# Patient Record
Sex: FEMALE | Race: WHITE | NOT HISPANIC OR LATINO | URBAN - METROPOLITAN AREA
[De-identification: names, ages, dates, MRNs, and addresses within clinical notes are randomized per-mention and may not be internally consistent; named-entity substitution may affect disease eponyms.]

---

## 2023-12-19 ENCOUNTER — OFFICE VISIT (OUTPATIENT)
Dept: OBSTETRICS AND GYNECOLOGY | Facility: CLINIC | Age: 43
End: 2023-12-19
Payer: COMMERCIAL

## 2023-12-19 ENCOUNTER — PATIENT MESSAGE (OUTPATIENT)
Dept: SURGICAL ONCOLOGY | Facility: HOSPITAL | Age: 43
End: 2023-12-19

## 2023-12-19 VITALS
WEIGHT: 144 LBS | HEIGHT: 61 IN | BODY MASS INDEX: 27.19 KG/M2 | DIASTOLIC BLOOD PRESSURE: 60 MMHG | SYSTOLIC BLOOD PRESSURE: 120 MMHG

## 2023-12-19 DIAGNOSIS — Z12.4 CERVICAL CANCER SCREENING: ICD-10-CM

## 2023-12-19 DIAGNOSIS — Z01.419 WELL WOMAN EXAM WITH ROUTINE GYNECOLOGICAL EXAM: Primary | ICD-10-CM

## 2023-12-19 PROCEDURE — 88141 CYTOPATH C/V INTERPRET: CPT | Performed by: PATHOLOGY

## 2023-12-19 PROCEDURE — 88175 CYTOPATH C/V AUTO FLUID REDO: CPT

## 2023-12-19 PROCEDURE — 87624 HPV HI-RISK TYP POOLED RSLT: CPT

## 2023-12-19 PROCEDURE — 99386 PREV VISIT NEW AGE 40-64: CPT | Performed by: OBSTETRICS & GYNECOLOGY

## 2023-12-19 PROCEDURE — 1036F TOBACCO NON-USER: CPT | Performed by: OBSTETRICS & GYNECOLOGY

## 2023-12-19 RX ORDER — ESTRADIOL 0.1 MG/G
CREAM VAGINAL
COMMUNITY
Start: 2023-09-25

## 2023-12-19 RX ORDER — MONTELUKAST SODIUM 4 MG/1
TABLET, CHEWABLE ORAL
COMMUNITY

## 2023-12-19 RX ORDER — TAMOXIFEN CITRATE 10 MG/1
10 TABLET ORAL DAILY
COMMUNITY
End: 2024-05-16

## 2023-12-19 RX ORDER — FLUTICASONE PROPIONATE 50 MCG
1 SPRAY, SUSPENSION (ML) NASAL
COMMUNITY
Start: 2012-10-16

## 2023-12-19 ASSESSMENT — PAIN SCALES - GENERAL: PAINLEVEL: 0-NO PAIN

## 2023-12-19 NOTE — PROGRESS NOTES
LEORA ( Breast/Pelvic Exam )    Last pap: not sure ( nothing in system )  Last mammo: [facility] breast [facility]  Last colon: n/a    CHAPERONE, BRANDON FOSS, NRCMA III    43-year-old  1 (1001) who comes in for her annual gynecologic exam  Her Care Was at [facility].  She denies any prior history of abnormal Paps however does not recall when her last one was.    She continues to have menses q. monthly, her partner has had a vasectomy    She is followed at the high risk breast clinic @ [facility]  She had a right breast biopsy consistent with LCIS and believed to have a 80% lifetime risk of breast cancer  Mother had breast cancer in her 60s  She also has very dense breasts imaging is difficult  She has had multiple MRIs  She declines a breast exam here today    She was started on tamoxifen 20 mg approximately 1 year ago  She could not tolerate this dose secondary to side effects  She was restarted in 2023 on a 10 mg dose which she is currently on  She has an MRI scheduled on 2023-  she is considering prophylactic mastectomy    CONSTITUTIONAL: Alert and in no acute distress. Well developed, well nourished.   HEAD AND FACE: Head and face: Normal.   EYES: Normal external exam - nonicteric sclera, extraocular movements intact (EOMI) and no ptosis.   EARS, NOSE, MOUTH, AND THROAT: External inspection of ears and nose: Normal.     GENITOURINARY: External genitalia: Normal. No inguinal lymphadenopathy. Bartholin's Urethral and Skenes Glands: Normal. Urethra: Normal. Bladder: Normal on palpation. Vagina: Normal. Cervix: Normal. Uterus: Normal. Right Adnexa/parametria: Normal. Left Adnexa/parametria: Normal. Inspection of Perianal Area: Normal.   MUSCULOSKELETAL: No joint swelling seen, normal movements of all extremities.   SKIN: Normal skin color and pigmentation, normal skin turgor, and no rash.   NEUROLOGIC: Non-focal. Grossly intact.        PSYCHIATRIC: Alert and oriented x 3. Affect normal to patient baseline. Mood:  Appropriate.     Assessment/plan  High risk breast cancer on tamoxifen, prior history of LCIS   Pap with cotesting done

## 2023-12-22 ENCOUNTER — ANCILLARY PROCEDURE (OUTPATIENT)
Dept: RADIOLOGY | Facility: CLINIC | Age: 43
End: 2023-12-22
Payer: COMMERCIAL

## 2023-12-22 DIAGNOSIS — Z12.39 ENCOUNTER FOR OTHER SCREENING FOR MALIGNANT NEOPLASM OF BREAST: ICD-10-CM

## 2023-12-22 DIAGNOSIS — R92.2 INCONCLUSIVE MAMMOGRAM: Primary | ICD-10-CM

## 2023-12-22 PROCEDURE — 77049 MRI BREAST C-+ W/CAD BI: CPT

## 2023-12-22 PROCEDURE — 2550000001 HC RX 255 CONTRASTS: Performed by: NURSE PRACTITIONER

## 2023-12-22 PROCEDURE — 77049 MRI BREAST C-+ W/CAD BI: CPT | Mod: BILATERAL PROCEDURE | Performed by: STUDENT IN AN ORGANIZED HEALTH CARE EDUCATION/TRAINING PROGRAM

## 2023-12-22 PROCEDURE — A9575 INJ GADOTERATE MEGLUMI 0.1ML: HCPCS | Performed by: NURSE PRACTITIONER

## 2023-12-22 RX ORDER — GADOTERATE MEGLUMINE 376.9 MG/ML
12 INJECTION INTRAVENOUS
Status: COMPLETED | OUTPATIENT
Start: 2023-12-22 | End: 2023-12-22

## 2023-12-22 RX ADMIN — GADOTERATE MEGLUMINE 12 ML: 376.9 INJECTION INTRAVENOUS at 14:58

## 2023-12-27 DIAGNOSIS — R92.8 ABNORMAL FINDING ON BREAST IMAGING: ICD-10-CM

## 2023-12-27 NOTE — PROGRESS NOTES
"aSrah Welch female   1980 43 y.o.   [id number]      Chief Complaint  High risk surveillance care, left breast mass    History Of Present Illness  Sarah Welch is a very pleasant 43 year old  woman [facility] for [facility] surveillance care. 2021 she had a right breast excisional biopsy demonstrating lobular carcinoma in situ (LCIS) and complex sclerosing lesion. 2022 she underwent additional right breast MRI core biopsy due to non-mass enhancement. Final pathology demonstrated LCIS, atypical lobular hyperplasia (ALH) and other benign findings. She did not have an additional excision and opted for observation with imaging. She has a remote history of left breast excisional biopsy in , benign. She started Tamoxifen in 2021 and took for 4 months. She self-discontinued due to intolerable hot flashes and vaginal dryness. She restarted Tamoxifen 10mg daily for three years in 2023. She is currently tolerating with severe vaginal dryness and \"cuts\". She was started on Estradiol cream. She is currently being followed for a stable left breast mass noted on ultrasound. She has family history of breast cancer in her mother, age 63 and paternal half cousin, gene positive. She presents today for second look ultrasound from recent MRI. She denies any new masses or lumps. She has a history of basal cell carcinoma, surgically excised from arm, chest, leg and face. She follows with a Dermatologist every 6 months. She is also interested in discussing bilateral prophylactic mastectomy with reconstruction.      BREAST IMAGIN2023 Bilateral Full MRI, indicates BI-RADS Category 3. Left breast, probably benign rim enhancing mass warranting second look ultrasound. If no sonographic correlate, short term MRI follow up recommended. 2023 Bilateral diagnostic mammogram and left breast ultrasound, indicates BI-RADS Category 3. Left breast, stable probably benign mass " warranting final follow up in one year.      FEMALE HISTORY: menarche age 9, , first birth age 33,  x 6 months, OCP's x 16 years, premenopausal, LMP 2023, no contraception,  had vasectomy, current use of Estradiol cream, extremely dense tissue     FAMILY CANCER HISTORY:  Mother: Breast cancer, age 63   Paternal Half Cousin: Breast cancer, 30s, contralateral recurrence, age 51, reports positive for a gene, unsure of type      Surgical History  She has a past surgical history that includes Breast lumpectomy (10/04/2016).     Social History  She reports that she has never smoked. She has never used smokeless tobacco. She reports current alcohol use. She reports that she does not use drugs.    Family History  No family history on file.     Allergies  Promethazine, Penicillins, and Tea tree oil    Medications  Current Outpatient Medications   Medication Instructions    estradiol (Estrace) 0.01 % (0.1 mg/gram) vaginal cream INSERT 1/4 APPLICATORFUL VAGINALLY THREE TIMES WEEKLY.    fluticasone (Flonase) 50 mcg/actuation nasal spray 1 spray, nasal, Daily RT    montelukast (Singulair) 4 mg chewable tablet oral    tamoxifen (NOLVADEX) 10 mg, oral, Daily         REVIEW OF SYSTEMS    Constitutional:  Negative for appetite change, fatigue, fever and unexpected weight change.   HENT:  Negative for ear pain, hearing loss, nosebleeds, sore throat and trouble swallowing.    Eyes:  Negative for discharge, itching and visual disturbance.   Respiratory:  Negative for cough, chest tightness and shortness of breath.    Cardiovascular:  Negative for chest pain, palpitations and leg swelling.   Breast: as indicated in HPI  Gastrointestinal:  Negative for abdominal pain, constipation, diarrhea and nausea.   Endocrine: Negative for cold intolerance and heat intolerance.   Genitourinary:  Negative for dysuria, frequency, hematuria, pelvic pain and vaginal bleeding.   Musculoskeletal:  Negative for arthralgias, back  pain, gait problem, joint swelling and myalgias.   Skin:  Negative for color change and rash.   Allergic/Immunologic: Negative for environmental allergies and food allergies.   Neurological:  Negative for dizziness, tremors, speech difficulty, weakness, numbness and headaches.   Hematological:  Does not bruise/bleed easily.   Psychiatric/Behavioral:  Negative for agitation, dysphoric mood and sleep disturbance. The patient is not nervous/anxious.         Past Medical History  She has a past medical history of Personal history of other malignant neoplasm of skin.     Physical Exam  Patient is alert and oriented x3 and in a relaxed and appropriate mood. Her gait is steady and hand grasps are equal. Sclera is clear. The breasts are nearly symmetrical. The tissue is very dense without palpable abnormalities, discrete nodules or masses. Both breasts have well-healed partial circumareolar excisional biopsy incisions. The skin and nipples appear normal. There is a well-healed incision mid chest from BCC removal. There is no cervical, supraclavicular or axillary lymphadenopathy. Heart rate and rhythm normal, S1 and S2 appreciated. The lungs are clear to auscultation bilaterally. Abdomen is soft and non-tender.     Physical Exam  Chest:              Last Recorded Vitals  Vitals:    01/03/24 1328   BP: 122/80   Pulse: 91   Resp: 16       Relevant Results   Time was spent discussing digital images of the radiology testing with the patient. I explained the results in depth, along with suggested explanation for follow up recommendations based on the testing results. BI-RADS Category 3    Imaging    Study Result    Narrative & Impression   Interpreted By:  Kris Hatfield,   STUDY:  BI US BREAST LIMITED LEFT;  1/3/2024 2:33 pm      ACCESSION NUMBER(S):  [id number]      ORDERING CLINICIAN:  HANG FRANKS      INDICATION:  Second-look ultrasound for evaluation of a probably benign rim  enhancing left breast mass seen on recent MRI  dated 12/22/2023.      COMPARISON:  MRI from 12/22/2023, mammogram and ultrasound from 08/16/2023,  12/02/2022      FINDINGS:  Targeted ultrasound was performed of the left breast by a registered  sonographer with elastography. Multiple benign circumscribed  hypoechoic and anechoic cysts with increased through transmission are  seen throughout the left breast, the largest in the left breast at  2:30, 2 cm from the nipple. These are soft on elastography and do not  contain flow. It is difficult to distinguish if these cysts might  correspond to the finding seen on MRI. No suspicious masses are  visualized in therefore short-term follow-up on MRI can be performed.      IMPRESSION:  Probably benign rim enhancing left breast mass seen on MRI without a  suspicious corresponding mass seen on ultrasound. Short-term  follow-up MRI is recommended in 6 months to ensure stability.      BI-RADS CATEGORY:      BI-RADS Category:  3 Probably Benign.  Recommendation:  Short Interval Follow-up.  Recommended Date:  6 Months.  Laterality:  Left.           MR breast bilateral w contrast full protocol 12/22/2023    Narrative  Interpreted By:  Megan Thompson,  STUDY:  BI MR BREAST BILATERAL WITH CONTRAST FULL PROTOCOL;  12/22/2023 2:57  pm    ACCESSION NUMBER(S):  [id number]    ORDERING CLINICIAN:  HANG FRANKS    INDICATION:  High-risk screening. Family history of breast cancer. Personal  history right breast core biopsy yielding LCIS and ALH (01/2022).  History of benign right excisional biopsy for LCIS and sclerosing  lesion.    COMPARISON:  Breast MRI 07/01/2022 mammogram 08/16/2023, ultrasound 08/16/2023,  12/02/2022    TECHNIQUE:  Using a dedicated breast coil, STIR axial and T1-weighted fat  saturation axial images of the breasts were obtained, the latter both  before and after intravenous administration of Gadolinium DTPA. On an  independent workstation, 3-D images were formulated using Expert360  including time enhancement  curves, subtraction images and MIP images.    Intravenous contrast:  12 mL of Dotarem    FINDINGS:  There is  symmetric  moderate bilateral background enhancement.    Density:  The breast tissue is extremely dense, which may limit the  sensitivity of mammography.    RIGHT BREAST: There is a biopsy marker clip in the upper-outer  quadrant of the right breast at posterior depth. No suspicious mass  or nonmass enhancement is identified.    No axillary or internal mammary lymphadenopathy is appreciated.    LEFT BREAST: There is a 0.6 x 0.6 x 0.6 round rim enhancing mass with  associated T2 hyperintense signal in the upper slightly outer left  breast at middle depth (series 7, image 58). Prominent patchy  peripheral enhancement in central inner left breast at middle and  posterior depth is similar to prior exam is favored to represent  background parenchymal enhancement (series 8, image 79). There are a  few scattered benign cysts measuring up to 2.5 cm.    No axillary or internal mammary lymphadenopathy is appreciated.    NON-BREAST FINDINGS:  None.    Impression  1. Probably benign rim enhancing mass likely an inflamed cyst in the  left breast. Targeted ultrasound is recommended for further  evaluation. If the finding is not identified on ultrasound, follow-up  with MRI is recommended in six-months.  2. No MRI evidence of malignancy in the right breast.    Of note, patient has a probably benign complicated cyst in the left  breast at 10 o'clock for which final follow-up in 1 year was  recommended on 08/16/2023.    BI-RADS CATEGORY:  BI-RADS Category:  3 Probably Benign.  Recommendation:  Recommendations as Above.  Recommended Date:  1 Month.  Laterality:  Left.    For any future breast imaging appointments, please call [phone]  ([phone]).      MACRO:  None    Signed by: Megan Thompson 12/26/2023 8:46 AM  Dictation workstation:   [id number]       Assessment/Plan   High risk surveillance care, normal clinical exam  and stable imaging, left breast masses, history of right breast LCIS, family history of breast cancer, Tamoxifen therapy, extremely dense tissue     Plan: Return in August 2024 for bilateral diagnostic mammogram with left breast ultrasound and office visit. Continue Tamoxifen 10mg daily. Continue Estradiol as prescribed. Will refer to Breast Surgery for discussion of prophylactic mastectomy following visit in August pending normal imaging results.     Patient Discussion/Summary  Your clinical examination and imaging are stable. Please return in August 2024 for bilateral diagnostic mammogram with left breast ultrasound and office visit or sooner if you have any problems or concerns. Proceed to Full MRI in July 2024. Continue Tamoxifen 10mg daily.     You can see your health information, review clinical summaries from office visits & test results online when you follow your health with MY  Chart, a personal health record. To sign up go to [email or web address]. If you need assistance with signing up or trouble getting into your account call [employer] Patient Line 24/7 at [phone].    My office phone number is [phone] if you need to get in touch with me or have additional questions or concerns. Thank you for choosing [facility] and trusting me as your healthcare provider. I look forward to seeing you again at your next office visit. I am honored to be a provider on your health care team and I remain dedicated to helping you achieve your health goals.      Prabha Verduzco, APRN-CNP

## 2024-01-03 ENCOUNTER — OFFICE VISIT (OUTPATIENT)
Dept: SURGICAL ONCOLOGY | Facility: HOSPITAL | Age: 44
End: 2024-01-03
Payer: COMMERCIAL

## 2024-01-03 ENCOUNTER — HOSPITAL ENCOUNTER (OUTPATIENT)
Dept: RADIOLOGY | Facility: HOSPITAL | Age: 44
Discharge: HOME | End: 2024-01-03
Payer: COMMERCIAL

## 2024-01-03 VITALS
WEIGHT: 144 LBS | HEART RATE: 91 BPM | DIASTOLIC BLOOD PRESSURE: 80 MMHG | HEIGHT: 61 IN | SYSTOLIC BLOOD PRESSURE: 122 MMHG | RESPIRATION RATE: 16 BRPM | BODY MASS INDEX: 27.19 KG/M2

## 2024-01-03 DIAGNOSIS — Z12.39 BREAST CANCER SCREENING, HIGH RISK PATIENT: ICD-10-CM

## 2024-01-03 DIAGNOSIS — R92.8 ABNORMAL FINDING ON BREAST IMAGING: ICD-10-CM

## 2024-01-03 DIAGNOSIS — R92.8 ABNORMAL MRI, BREAST: Primary | ICD-10-CM

## 2024-01-03 PROCEDURE — 76982 USE 1ST TARGET LESION: CPT | Mod: LT

## 2024-01-03 PROCEDURE — 1036F TOBACCO NON-USER: CPT | Performed by: NURSE PRACTITIONER

## 2024-01-03 PROCEDURE — 76642 ULTRASOUND BREAST LIMITED: CPT | Mod: LT

## 2024-01-03 PROCEDURE — 99213 OFFICE O/P EST LOW 20 MIN: CPT | Performed by: NURSE PRACTITIONER

## 2024-01-03 NOTE — PATIENT INSTRUCTIONS
Your clinical examination and imaging are stable. Please return in August 2024 for bilateral diagnostic mammogram with left breast ultrasound and office visit or sooner if you have any problems or concerns. Proceed to Full MRI in July 2024. Continue Tamoxifen 10mg daily.     You can see your health information, review clinical summaries from office visits & test results online when you follow your health with MY  Chart, a personal health record. To sign up go to [email or web address]. If you need assistance with signing up or trouble getting into your account call [employer] Patient Line 24/7 at [phone].    My office phone number is [phone] if you need to get in touch with me or have additional questions or concerns. Thank you for choosing [facility] and trusting me as your healthcare provider. I look forward to seeing you again at your next office visit. I am honored to be a provider on your health care team and I remain dedicated to helping you achieve your health goals.

## 2024-06-17 ENCOUNTER — OFFICE VISIT (OUTPATIENT)
Dept: SURGICAL ONCOLOGY | Facility: HOSPITAL | Age: 44
End: 2024-06-17
Payer: COMMERCIAL

## 2024-06-17 VITALS — WEIGHT: 140 LBS | HEIGHT: 61 IN | BODY MASS INDEX: 26.43 KG/M2

## 2024-06-17 DIAGNOSIS — D05.01 LOBULAR CARCINOMA IN SITU (LCIS) OF RIGHT BREAST: ICD-10-CM

## 2024-06-17 DIAGNOSIS — Z91.89 AT HIGH RISK FOR BREAST CANCER: ICD-10-CM

## 2024-06-17 DIAGNOSIS — R92.2 DENSE BREASTS: ICD-10-CM

## 2024-06-17 DIAGNOSIS — R92.30 DENSE BREASTS: ICD-10-CM

## 2024-06-17 DIAGNOSIS — R92.8 ABNORMAL MRI, BREAST: ICD-10-CM

## 2024-06-17 PROCEDURE — 99204 OFFICE O/P NEW MOD 45 MIN: CPT | Performed by: SURGERY

## 2024-06-17 PROCEDURE — 99214 OFFICE O/P EST MOD 30 MIN: CPT | Performed by: SURGERY

## 2024-06-17 NOTE — PROGRESS NOTES
BREAST SURGERY HIGH RISK SURGICAL CONSULTATION    Assessment/Plan     1. High risk with lifetime risk 80%   - following enhanced screening    2. Peronal history LCIS    3. Family history of breast cancer    4. Probably benign left breast cyst seen on MRI 12/22/2023 without sonographic correlate   -6 month MRI recommened.    Due for MRI now for probably benign findings.    Risk reducing mastectomy is reasonable considering elevated risk and side effects related to tamoxifen.  Will need to meet with Dr. Jimenez to discuss reconstruction options.  Would be nipple sparing candidate with DTI.  Has history of keloid scarrning- may benefit from kenalog injection intra operatively.  Plan for surgery in November pending insurance authorization.     Subjective   Sarah Welch is a 44 y.o. female presents today for surgical discussion prophylactic mastectomy.    Followed in high risk clinic for lifetime risk 80% related to LCIS, extremely dense breasts and family history.     Last breast imaging was MRI in December with probably benign left breast findings without a sonographic correlate.     She has been on tamoxifen and having significant side effects.  She is on a reduced dose of tamoxifen as a result.      Treatment history      Review of Systems   Constitutional symptoms: Denies generalized fatigue.  Denies weight change, fevers/chills, difficulty sleeping   Eyes: Denies double vision, glaucoma, cataracts.  Ear/nose/throat/mouth: Denies hearing changes, sore throat, sinus problems.  Cardiovascular: No chest pain.  Denies irregular heartbeat.  Denies ankle swelling.  Respiratory: No wheezing, cough, or shortness of breath.  Gastrointestinal: No abdominal pain,  No nausea/vomiting.  No indigestion/heartburn.  No change in bowel habits.  No constipation or diarrhea.   Genitourinary: No urinary incontinence.  No urinary frequency.  No painful urination.  Musculoskeletal: No bone pain, no muscle pain, no joint pain.    Integumentary: No rash. No masses.  No changes in moles.  No easy bruising.  Neurological: No headaches.  No tremors. No numbness/tingling.  Psychiatric: No anxiety. No depression.  Endocrine: No excessive thirst.  Not too hot or too cold.  Not tired or fatigued.    Hematological/lymphatic: No swollen glands or blood clotting problems.  No bruising.    Objective   Physical Exam  General: Alert and oriented x 3.  Mood and affect are appropriate.  HEENT: EOMI, PERRLA.   Neck: supple, no masses, no cervical adenopathy.  Cardiovascular: no lower extremity edema.  Pulmonary: breathing non labored on room air.  GI: Abdomen soft, no masses. No hepatomegaly or splenomegaly.  Lymph nodes: No supraclavicular or axillary adenopathy bilaterally.  Musculoskeletal: Full range of motion in the upper extremities bilaterally.  Neuro: denies dizziness, tremors    Breasts: The breasts were examined in both the seated and supine positions.    RIGHT: The nipple is everted without nipple discharge.  There are no skin changes, skin thickening, or dimpling. There are no masses palpated in the RIGHT breast.   LEFT: The nipple is everted without nipple discharge.  There are no skin changes, skin thickening, or dimpling. There are no masses palpated in the LEFT breast.   Keloid scar on the mid chest overlying sternum and extending slightly to medial right breast.  Bilateral breast excisional biopsy scars without significant keloiding.    Radiology review: All images and reports were personally reviewed.         Beth Prajapati, DO

## 2024-06-18 ENCOUNTER — PATIENT MESSAGE (OUTPATIENT)
Dept: SURGICAL ONCOLOGY | Facility: HOSPITAL | Age: 44
End: 2024-06-18
Payer: COMMERCIAL

## 2024-06-18 NOTE — TELEPHONE ENCOUNTER
From: Sarah Welch  To: Beth Prajapati  Sent: 6/18/2024 9:47 AM EDT  Subject: Tamoxifen    Hi Dr Prajapati,  Thank you so much for the visit yesterday--I am a little nervous, but also relieved we are moving forward with the mastectomy's.     I knew I would have a question~ What are your thoughts on Tamoxifen? Should I continue or can I discontinue taking this medicine?    Sarah

## 2024-07-02 ENCOUNTER — HOSPITAL ENCOUNTER (OUTPATIENT)
Dept: RADIOLOGY | Facility: HOSPITAL | Age: 44
Discharge: HOME | End: 2024-07-02
Payer: COMMERCIAL

## 2024-07-02 DIAGNOSIS — R92.2 DENSE BREASTS: ICD-10-CM

## 2024-07-02 DIAGNOSIS — R92.30 DENSE BREASTS: ICD-10-CM

## 2024-07-02 DIAGNOSIS — R92.8 ABNORMAL MRI, BREAST: ICD-10-CM

## 2024-07-02 PROCEDURE — 77049 MRI BREAST C-+ W/CAD BI: CPT | Performed by: STUDENT IN AN ORGANIZED HEALTH CARE EDUCATION/TRAINING PROGRAM

## 2024-07-02 PROCEDURE — A9575 INJ GADOTERATE MEGLUMI 0.1ML: HCPCS | Performed by: SURGERY

## 2024-07-02 PROCEDURE — 2500000004 HC RX 250 GENERAL PHARMACY W/ HCPCS (ALT 636 FOR OP/ED): Performed by: SURGERY

## 2024-07-02 PROCEDURE — 77049 MRI BREAST C-+ W/CAD BI: CPT

## 2024-07-02 RX ORDER — GADOTERATE MEGLUMINE 376.9 MG/ML
13 INJECTION INTRAVENOUS
Status: COMPLETED | OUTPATIENT
Start: 2024-07-02 | End: 2024-07-02

## 2024-08-09 ENCOUNTER — PATIENT MESSAGE (OUTPATIENT)
Dept: SURGICAL ONCOLOGY | Facility: HOSPITAL | Age: 44
End: 2024-08-09
Payer: COMMERCIAL

## 2024-08-13 NOTE — PROGRESS NOTES
"  Sarah Welch female   1980 44 y.o.   [id number]      Chief Complaint  High risk surveillance care, left breast mass    History Of Present Illness  Sarah Welch is a very pleasant 44 year old  woman [facility]. 2021 she had a right breast excisional biopsy demonstrating lobular carcinoma in situ (LCIS) and complex sclerosing lesion. 2022 she underwent additional right breast MRI core biopsy due to non-mass enhancement. Final pathology demonstrated LCIS, atypical lobular hyperplasia (ALH) and other benign findings. She did not have an additional excision and opted for observation with imaging. She has a remote history of left breast excisional biopsy in , benign. She started Tamoxifen in 2021 and took for 4 months. She self-discontinued due to intolerable hot flashes and vaginal dryness. She restarted Tamoxifen 10mg daily for three years in 2023. She was having severe vaginal dryness and \"cuts\". She was started on Estradiol cream. She states the cream helped a little, but was still intolerable and discontinued in 2024. She is currently being followed for a stable left breast mass noted on ultrasound. She has family history of breast cancer in her mother, age 63 and paternal half cousin, gene positive. She has a history of basal cell carcinoma, surgically excised from arm, chest, leg and face. She follows with a Dermatologist every 6 months. She is tentatively scheduled for bilateral prophylactic mastectomy with reconstruction on 11/15/2024 (Alo/Barbara). She presents today for annual mammogram and follow up left breast mass. She denies any new masses or lumps.     BREAST IMAGIN2024 Bilateral Full MRI, indicates BI-RADS Category 2. 2023 Bilateral diagnostic mammogram and left breast ultrasound, indicates BI-RADS Category 3. Left breast, stable probably benign mass warranting final follow up in one year. "      FEMALE HISTORY: menarche age 9, , first birth age 33,  x 6 months, OCP's x 16 years, premenopausal, no contraception,  had vasectomy, extremely dense tissue     FAMILY CANCER HISTORY:  Mother: Breast cancer, age 63   Paternal Half Cousin: Breast cancer, 30s, contralateral recurrence, age 51, reports positive for a gene, unsure of type      Surgical History  She has a past surgical history that includes Breast biopsy.     Social History  She reports that she has never smoked. She has never used smokeless tobacco. She reports current alcohol use. She reports that she does not use drugs.    Family History  No family history on file.     Allergies  Promethazine, Penicillins, and Tea tree oil    Medications  Current Outpatient Medications   Medication Instructions    montelukast (Singulair) 4 mg chewable tablet oral    tamoxifen (NOLVADEX) 10 mg, oral, Daily         REVIEW OF SYSTEMS    Constitutional:  Negative for appetite change, fatigue, fever and unexpected weight change.   HENT:  Negative for ear pain, hearing loss, nosebleeds, sore throat and trouble swallowing.    Eyes:  Negative for discharge, itching and visual disturbance.   Respiratory:  Negative for cough, chest tightness and shortness of breath.    Cardiovascular:  Negative for chest pain, palpitations and leg swelling.   Breast: as indicated in HPI  Gastrointestinal:  Negative for abdominal pain, constipation, diarrhea and nausea.   Endocrine: Negative for cold intolerance and heat intolerance.   Genitourinary:  Negative for dysuria, frequency, hematuria, pelvic pain and vaginal bleeding.   Musculoskeletal:  Negative for arthralgias, back pain, gait problem, joint swelling and myalgias.   Skin:  Negative for color change and rash.   Allergic/Immunologic: Negative for environmental allergies and food allergies.   Neurological:  Negative for dizziness, tremors, speech difficulty, weakness, numbness and headaches.   Hematological:   Does not bruise/bleed easily.   Psychiatric/Behavioral:  Negative for agitation, dysphoric mood and sleep disturbance. The patient is not nervous/anxious.         Past Medical History  She has a past medical history of Personal history of other malignant neoplasm of skin.     Physical Exam  Patient is alert and oriented x3 and in a relaxed and appropriate mood. Her gait is steady and hand grasps are equal. Sclera is clear. The breasts are nearly symmetrical. The tissue is very dense without palpable abnormalities, discrete nodules or masses. Both breasts have well-healed partial circumareolar excisional biopsy incisions. The skin and nipples appear normal. There is a well-healed incision mid chest from BCC removal with keloid. There is no cervical, supraclavicular or axillary lymphadenopathy. Heart rate and rhythm normal, S1 and S2 appreciated. The lungs are clear to auscultation bilaterally. Abdomen is soft and non-tender.     Physical Exam  Chest:              Last Recorded Vitals  Vitals:    08/21/24 0759   BP: 102/62   Pulse: 63   Resp: 16         Relevant Results   Time was spent discussing digital images of the radiology testing with the patient. I explained the results in depth, along with suggested explanation for follow up recommendations based on the testing results. BI-RADS Category 2    Imaging  Narrative & Impression   Interpreted By:  Gabriel Rojas,   STUDY:  BI MAMMO BILATERAL DIAGNOSTIC TOMOSYNTHESIS; BI US BREAST LIMITED  LEFT;  8/21/2024 9:20 am; 8/21/2024 9:49 am      ACCESSION NUMBER(S):  [id number]; [id number]      ORDERING CLINICIAN:  HANG FRANKS      INDICATION:  Final follow-up for a probably benign finding in the left breast.      COMPARISON:  05/31/2022      FINDINGS:  MAMMOGRAPHY: 2D and tomosynthesis images were reviewed at 1 mm slice  thickness.      Density:  The breast tissue is extremely dense, which may limit the  sensitivity of mammography.      Multiple bilateral  circumscribed masses are noted again. No new  suspicious masses or calcifications are identified.      ULTRASOUND: Targeted ultrasound was performed of the left breast by a  registered sonographer with elastography. Sonographic images of the  left breast the 10 o'clock position 3 cm from nipple redemonstrates  the it measures 0.8 x 0.4 x 0.5 cm. It is soft on avascular still.  This demonstrates long-term stability and is considered benign.      IMPRESSION:  Previously described finding at the 10 o'clock position of the left  breast is now considered benign. No new mammographic evidence of  malignancy.      BI-RADS CATEGORY:  BI-RADS Category:  2 Benign.  Recommendation:  Annual Screening.  Recommended Date:  1 Year.  Laterality:  Bilateral.       Assessment/Plan   High risk surveillance care, normal clinical exam and imaging, left breast masses, stable and benign, history of right breast LCIS, family history of breast cancer, Tamoxifen therapy, extremely dense tissue     Plan: Return in one year for clinical exam and office visit. If surgery has not been performed we will proceed with annual mammogram.     Patient Discussion/Summary  Your clinical examination and imaging are normal. Please return in one year for clinical exam and office visit. If surgery has not been performed we will proceed with annual mammogram.     You can see your health information, review clinical summaries from office visits & test results online when you follow your health with MY  Chart, a personal health record. To sign up go to [email or web address]. If you need assistance with signing up or trouble getting into your account call hint Patient Line 24/7 at [phone].    My office phone number is [phone] if you need to get in touch with me or have additional questions or concerns. Thank you for choosing [facility] and trusting me as your healthcare provider. I look forward to seeing you again at your next  office visit. I am honored to be a provider on your health care team and I remain dedicated to helping you achieve your health goals.      Prabha Verduzco, APRN-CNP

## 2024-08-21 ENCOUNTER — HOSPITAL ENCOUNTER (OUTPATIENT)
Dept: RADIOLOGY | Facility: HOSPITAL | Age: 44
Discharge: HOME | End: 2024-08-21
Payer: COMMERCIAL

## 2024-08-21 ENCOUNTER — OFFICE VISIT (OUTPATIENT)
Dept: SURGICAL ONCOLOGY | Facility: HOSPITAL | Age: 44
End: 2024-08-21
Payer: COMMERCIAL

## 2024-08-21 VITALS
RESPIRATION RATE: 16 BRPM | WEIGHT: 130 LBS | HEART RATE: 63 BPM | SYSTOLIC BLOOD PRESSURE: 102 MMHG | HEIGHT: 61 IN | BODY MASS INDEX: 24.55 KG/M2 | DIASTOLIC BLOOD PRESSURE: 62 MMHG

## 2024-08-21 DIAGNOSIS — R92.8 OTHER ABNORMAL AND INCONCLUSIVE FINDINGS ON DIAGNOSTIC IMAGING OF BREAST: ICD-10-CM

## 2024-08-21 DIAGNOSIS — Z12.39 BREAST CANCER SCREENING, HIGH RISK PATIENT: Primary | ICD-10-CM

## 2024-08-21 DIAGNOSIS — R92.30 DENSE BREAST TISSUE: ICD-10-CM

## 2024-08-21 PROCEDURE — 3008F BODY MASS INDEX DOCD: CPT | Performed by: NURSE PRACTITIONER

## 2024-08-21 PROCEDURE — 76982 USE 1ST TARGET LESION: CPT | Mod: LT

## 2024-08-21 PROCEDURE — 77062 BREAST TOMOSYNTHESIS BI: CPT

## 2024-08-21 PROCEDURE — 99213 OFFICE O/P EST LOW 20 MIN: CPT | Performed by: NURSE PRACTITIONER

## 2024-08-21 PROCEDURE — 1036F TOBACCO NON-USER: CPT | Performed by: NURSE PRACTITIONER

## 2024-08-21 PROCEDURE — 76642 ULTRASOUND BREAST LIMITED: CPT | Mod: LT

## 2024-08-21 NOTE — PATIENT INSTRUCTIONS
Your clinical examination and imaging are normal. Please return in one year for clinical exam and office visit. If surgery has not been performed we will proceed with annual mammogram.     You can see your health information, review clinical summaries from office visits & test results online when you follow your health with MY  Chart, a personal health record. To sign up go to [email or web address]. If you need assistance with signing up or trouble getting into your account call [employer] Patient Line 24/7 at [phone].    My office phone number is [phone] if you need to get in touch with me or have additional questions or concerns. Thank you for choosing [facility] and trusting me as your healthcare provider. I look forward to seeing you again at your next office visit. I am honored to be a provider on your health care team and I remain dedicated to helping you achieve your health goals.

## 2024-09-23 ENCOUNTER — APPOINTMENT (OUTPATIENT)
Dept: PLASTIC SURGERY | Facility: CLINIC | Age: 44
End: 2024-09-23
Payer: COMMERCIAL

## 2024-09-23 VITALS — BODY MASS INDEX: 24.55 KG/M2 | HEIGHT: 61 IN | WEIGHT: 130 LBS

## 2024-09-23 DIAGNOSIS — Z91.89 INCREASED RISK OF BREAST CANCER: Primary | ICD-10-CM

## 2024-09-23 PROCEDURE — 99215 OFFICE O/P EST HI 40 MIN: CPT | Performed by: SURGERY

## 2024-09-23 NOTE — LETTER
September 23, 2024     Beth Prajapati DO  [id number] [facility], [address] [place] [id number]    Patient: Sarah Welch   YOB: 1980   Date of Visit: 9/23/2024       Dear Dr. Beth Prajapati DO:    Thank you for referring Sarah Welch to me for evaluation. Below are my notes for this consultation.  If you have questions, please do not hesitate to call me. I look forward to following your patient along with you.       Sincerely,     Rex Jimenez MD      CC: No Recipients  ______________________________________________________________________________________                    Division of Plastic & Reconstructive Surgery            New patient visit    Date: 9/23/2024         Subjective  Sarah Welch is a 44 y.o. female who was referred by Dr. Beth Prajapati  for increased lifetime risk of breast cancer.    Asiya is a very pleasant 44-year-old female who is being followed by her breast colleagues for increased lifetime risk of breast cancer, 80% related to LCIS and extremely dense breast tissue and family history.  Most recent imaging was an MRI performed in December showing a probably benign left breast findings without sonographic correlate.  Patient has been on tamoxifen and is noted to have side effects and last visit with our breast colleagues.  Per chart review: 6/30/2021 she had a right breast excisional biopsy demonstrating lobular carcinoma in situ (LCIS) and complex sclerosing lesion. 1/19/2022 she underwent additional right breast MRI core biopsy due to non-mass enhancement. Final pathology demonstrated LCIS, atypical lobular hyperplasia (ALH) and other benign findings. She did not have an additional excision and opted for observation with imaging. She has a remote history of left breast excisional biopsy in 2010, benign. She started Tamoxifen in November 2021 and took for 4 months. She self-discontinued due to intolerable hot flashes and vaginal dryness. She  "restarted Tamoxifen 10mg daily for three years in July 2023. She was having severe vaginal dryness and \"cuts\". She was started on Estradiol cream. She states the cream helped a little, but was still intolerable and discontinued in July 2024. She is currently being followed for a stable left breast mass noted on ultrasound. She has family history of breast cancer in her mother, age 63 and paternal half cousin, gene positive. She has a history of basal cell carcinoma, surgically excised from arm, chest, leg and face     All other systems have been reviewed with the patient and have been found to be negative with exception to the chief complaint as mentioned in the history of present illness.    ROS: As noted in history of present illness  - CONSTITUTIONAL: Denies weight loss, fever and chills.  - HEENT: Denies changes in vision and hearing.  - RESPIRATORY: Denies SOB and cough, difficulty breathing  - CV: Denies palpitations and CP  - GI: Denies abdominal pain, nausea, vomiting and diarrhea.  - : Denies dysuria and urinary frequency.  - MSK: Denies myalgia and joint pain.  - SKIN: Denies rash and pruritus.  - NEUROLOGICAL: Denies headache and syncope.  - PSYCHIATRIC: Denies recent changes in mood. Denies anxiety and depression.    I reviewed with the patient the remainder of the his personal, medical, surgical and social history, found not to be pertinent to chief complaint. I specifically reviewed the family history with patient, found not to be pertinent to chief complaint.      Objective   There were no vitals filed for this visit.    Gen: interactive and pleasant  Head: NCAT  Eyes: EOMI, PERRLA  Mouth: MMM  Throat: trachea midline  Cor: RRR  Pulm: nonlabored breathing  Abd: s/nt/nd  Neuro: AAOx3  Ext: extremities perfused    Body mass index is 24.56 kg/m².      Focused exam of the:                               R                  L  SN:NAC             21 cm              20 cm  NAC:IMF            8 cm             " 8 cm         BW                    13 cm              13 cm        NAC diam         3.5 cm              3.5 cm                                                                                   Ptosis Grade     0               0                                                       Bra Size         34B    Right nipple has a superior/lateral outer circumareolar incision for previous biopsies  Left nipple has a medial circumareolar incision for biopsy  Both nipples are approximate 1.5 cm pedunculated and protruding       Assessment/Plan      Sarah is a 44 y.o. female who presents for breast reconstruction in the setting of increased lifetime risk for breast cancer, related to history of LCIS, family history and extremely breast dense breasts    I had a long discussion regarding options for unilateral and bilateral reconstruction, we discussed indications for both, and stressed that oncologic decisions and discussions are best had with her breast surgeon and oncology team.    We reviewed autologous and implant-based reconstruction. I laid out for her in lay English the benefits and risks of both types of reconstruction. We discussed the most common reconstructions performed, we discussed the anticipated recovery and follow up for each.    I described to her autologous tissue reconstruction including pedicled TRAM, pedicled Latissimus (with and without expander/implant), free Gracilis, profunda artery , free Gluteus-based and free SARI-type reconstructions. I described the post-operative recovery, donor site morbidity, basic technical aspects of each reconstruction, post-operative protocols and outcomes of each. We reviewed that in our practice, we routinely perform autologous reconstruction in two stages. We reviewed the reasoning for this, and stressed that it is our practice to avoid radiating the final reconstruction.     I went on to discuss implant based reconstruction including total submuscular,  sub-pectoral, and pre-pectoral tissue expander based surgery.  I discussed direct-to-implant reconstruction and stressed that if this were an option, it would essentially mean that she might be able to forego expansion, but noted that this was not a single stage reconstruction. I described the benefits of each and post-operative protocol for each.    We went on to discuss the risks of implant-based reconstruction, including infection, delayed healing, and flap necrosis.  We discussed the possibility of delayed reconstruction in the rare event that the mastectomy flaps blood supply is deemed unsuitable for reconstruction intraoperatively.       We discussed options for Direct to Implant (DTI) reconstruction.  We discussed that indications for this include patients who are healthy, non-smoking patients with small- to moderate-sized breasts who desire to be of similar breast size following reconstruction.  We mentioned that patients who wish to attain a significantly larger breast size are better off with a 2-stage tissue-espander-implant reconstruction.  We stressed that patients undergoing DTI reconstruction should harbor realistic expectations about their range of postoperative breast size and she understands that breast enlargement is not possible.  We discussed that DTI should not be viewed as a single stage reconstruction, but that interval second stage fat grafting should be considered as part of the reconstructive plan.    Patients with prior breast radiation or preexisting scars that adversely affect mastectomy skin flap perfusion are not considered good candidates for DTI. Similarly, patients with morbid obesity, uncontrolled diabetes mellitus, poorly perfused or thin mastectomy skin flaps, or advanced oncologic disease would best be offered a two-stage approach instead.   Additionally, we discussed mastectomy incision type. When oncologic safety allows, nipple sparing mastectomy is preferred.  The first  choice and most favorable NSM incision is the lateral inframammary fold (IMF), as it permits good visualization for the mastectomy and is the least noticeable on frontal view. While large breasts pose a challenge for the oncologic surgeon, this incision has been associated with the lowest rate of nipple necrosis in the literature. We discussed that second and third choices are typically the vertical areolar to IMF incision and the inverted-T incision. Due to challenges in accessing the axilla through the vertical incision, a second incision is sometimes required. The lateral radial incision is the least favored of the incisions. It is directly visible on the breast and can cause retraction ischemia in the mastectomy skin flaps.  We discussed that should there be any doubt regarding the intraoperative viability of the skin flap, the procedure would be modified and an expander placed in the prepectoral pocket, or the reconstruction would be deferred. If tissue expander is placed, the expander can then be injected with a volume sufficient to gently fill the skin envelope (hand-in-glove fit) without causing skin tension.  We would then complete filling of the expander once the skin has healed.  If no expander is placed and reconstruction is abandoned, we would plan to return to OR to perform delayed tissue-expander reconstruction.  We discussed that post-operative drains would be required to drain all dead-space fluid to prevent seroma and limit stress on the skin envelope in the post-operative period.  Drains are removed when output is less than 30cc for 48 hours.     I discussed with her common implant complications as rippling, animation deformity, rupture, and capsular contracture. Furthermore, I discussed the current cases of anaplastic T cell lymphoma as examples of risks of form-stable and textured implant based reconstruction.     I discussed with the patient that this is reconstructive surgery, and there are no  guarantees of results.  Sometimes, patients are dissatisfied and sometimes patients require revision surgery.  We try our best to achieve several goals in surgery, our goals of the surgery are #1 removal of cancer/cancer risk, #2 decrease risk of complication to 0 if possible, #3 are cosmetic/aesthetic result.  We discussed that we plan second stage surgeries, and that radiation will play a role in her overall outcome if it is indicated.      I gave her supplemental reading information regarding our discussion. I had her repeat to me in her own words what we had discussed, and she was able to do so satisfactorily.    After reviewing our discussion, it is my impression that -she is interested in direct to implant breast reconstruction with structured saline implant, as well as targeted nipple reinnervation      Plan:   Bilateral nipple sparing mastectomy  Direct to implant reconstruction with saline implant  Targeted nipple reinnervation    I spent 60 minutes with this patient. Greater than 50% of this time was spent in the counselling and/or coordination of care of this patient.  This note was created using voice recognition software and was not corrected for typographical or grammatical errors.    Signature: Rex Jimenez MD   Date: 9/23/2024

## 2024-09-23 NOTE — PROGRESS NOTES
"                Division of Plastic & Reconstructive Surgery            New patient visit    Date: 9/23/2024         Subjective   Sarah Welch is a 44 y.o. female who was referred by Dr. Beth Prajapati  for increased lifetime risk of breast cancer.    Asiya is a very pleasant 44-year-old female who is being followed by her breast colleagues for increased lifetime risk of breast cancer, 80% related to LCIS and extremely dense breast tissue and family history.  Most recent imaging was an MRI performed in December showing a probably benign left breast findings without sonographic correlate.  Patient has been on tamoxifen and is noted to have side effects and last visit with our breast colleagues.  Per chart review: 6/30/2021 she had a right breast excisional biopsy demonstrating lobular carcinoma in situ (LCIS) and complex sclerosing lesion. 1/19/2022 she underwent additional right breast MRI core biopsy due to non-mass enhancement. Final pathology demonstrated LCIS, atypical lobular hyperplasia (ALH) and other benign findings. She did not have an additional excision and opted for observation with imaging. She has a remote history of left breast excisional biopsy in 2010, benign. She started Tamoxifen in November 2021 and took for 4 months. She self-discontinued due to intolerable hot flashes and vaginal dryness. She restarted Tamoxifen 10mg daily for three years in July 2023. She was having severe vaginal dryness and \"cuts\". She was started on Estradiol cream. She states the cream helped a little, but was still intolerable and discontinued in July 2024. She is currently being followed for a stable left breast mass noted on ultrasound. She has family history of breast cancer in her mother, age 63 and paternal half cousin, gene positive. She has a history of basal cell carcinoma, surgically excised from arm, chest, leg and face     All other systems have been reviewed with the patient and have been found to be " negative with exception to the chief complaint as mentioned in the history of present illness.    ROS: As noted in history of present illness  - CONSTITUTIONAL: Denies weight loss, fever and chills.  - HEENT: Denies changes in vision and hearing.  - RESPIRATORY: Denies SOB and cough, difficulty breathing  - CV: Denies palpitations and CP  - GI: Denies abdominal pain, nausea, vomiting and diarrhea.  - : Denies dysuria and urinary frequency.  - MSK: Denies myalgia and joint pain.  - SKIN: Denies rash and pruritus.  - NEUROLOGICAL: Denies headache and syncope.  - PSYCHIATRIC: Denies recent changes in mood. Denies anxiety and depression.    I reviewed with the patient the remainder of the his personal, medical, surgical and social history, found not to be pertinent to chief complaint. I specifically reviewed the family history with patient, found not to be pertinent to chief complaint.      Objective    There were no vitals filed for this visit.    Gen: interactive and pleasant  Head: NCAT  Eyes: EOMI, PERRLA  Mouth: MMM  Throat: trachea midline  Cor: RRR  Pulm: nonlabored breathing  Abd: s/nt/nd  Neuro: AAOx3  Ext: extremities perfused    Body mass index is 24.56 kg/m².      Focused exam of the:                               R                  L  SN:NAC             21 cm              20 cm  NAC:IMF            8 cm             8 cm         BW                    13 cm              13 cm        NAC diam         3.5 cm              3.5 cm                                                                                   Ptosis Grade     0               0                                                       Bra Size         34B    Right nipple has a superior/lateral outer circumareolar incision for previous biopsies  Left nipple has a medial circumareolar incision for biopsy  Both nipples are approximate 1.5 cm pedunculated and protruding       Assessment/Plan       Sarah is a 44 y.o. female who presents for breast  reconstruction in the setting of increased lifetime risk for breast cancer, related to history of LCIS, family history and extremely breast dense breasts    I had a long discussion regarding options for unilateral and bilateral reconstruction, we discussed indications for both, and stressed that oncologic decisions and discussions are best had with her breast surgeon and oncology team.    We reviewed autologous and implant-based reconstruction. I laid out for her in lay English the benefits and risks of both types of reconstruction. We discussed the most common reconstructions performed, we discussed the anticipated recovery and follow up for each.    I described to her autologous tissue reconstruction including pedicled TRAM, pedicled Latissimus (with and without expander/implant), free Gracilis, profunda artery , free Gluteus-based and free SARI-type reconstructions. I described the post-operative recovery, donor site morbidity, basic technical aspects of each reconstruction, post-operative protocols and outcomes of each. We reviewed that in our practice, we routinely perform autologous reconstruction in two stages. We reviewed the reasoning for this, and stressed that it is our practice to avoid radiating the final reconstruction.     I went on to discuss implant based reconstruction including total submuscular, sub-pectoral, and pre-pectoral tissue expander based surgery.  I discussed direct-to-implant reconstruction and stressed that if this were an option, it would essentially mean that she might be able to forego expansion, but noted that this was not a single stage reconstruction. I described the benefits of each and post-operative protocol for each.    We went on to discuss the risks of implant-based reconstruction, including infection, delayed healing, and flap necrosis.  We discussed the possibility of delayed reconstruction in the rare event that the mastectomy flaps blood supply is deemed  unsuitable for reconstruction intraoperatively.       We discussed options for Direct to Implant (DTI) reconstruction.  We discussed that indications for this include patients who are healthy, non-smoking patients with small- to moderate-sized breasts who desire to be of similar breast size following reconstruction.  We mentioned that patients who wish to attain a significantly larger breast size are better off with a 2-stage tissue-espander-implant reconstruction.  We stressed that patients undergoing DTI reconstruction should harbor realistic expectations about their range of postoperative breast size and she understands that breast enlargement is not possible.  We discussed that DTI should not be viewed as a single stage reconstruction, but that interval second stage fat grafting should be considered as part of the reconstructive plan.    Patients with prior breast radiation or preexisting scars that adversely affect mastectomy skin flap perfusion are not considered good candidates for DTI. Similarly, patients with morbid obesity, uncontrolled diabetes mellitus, poorly perfused or thin mastectomy skin flaps, or advanced oncologic disease would best be offered a two-stage approach instead.   Additionally, we discussed mastectomy incision type. When oncologic safety allows, nipple sparing mastectomy is preferred.  The first choice and most favorable NSM incision is the lateral inframammary fold (IMF), as it permits good visualization for the mastectomy and is the least noticeable on frontal view. While large breasts pose a challenge for the oncologic surgeon, this incision has been associated with the lowest rate of nipple necrosis in the literature. We discussed that second and third choices are typically the vertical areolar to IMF incision and the inverted-T incision. Due to challenges in accessing the axilla through the vertical incision, a second incision is sometimes required. The lateral radial incision is  the least favored of the incisions. It is directly visible on the breast and can cause retraction ischemia in the mastectomy skin flaps.  We discussed that should there be any doubt regarding the intraoperative viability of the skin flap, the procedure would be modified and an expander placed in the prepectoral pocket, or the reconstruction would be deferred. If tissue expander is placed, the expander can then be injected with a volume sufficient to gently fill the skin envelope (hand-in-glove fit) without causing skin tension.  We would then complete filling of the expander once the skin has healed.  If no expander is placed and reconstruction is abandoned, we would plan to return to OR to perform delayed tissue-expander reconstruction.  We discussed that post-operative drains would be required to drain all dead-space fluid to prevent seroma and limit stress on the skin envelope in the post-operative period.  Drains are removed when output is less than 30cc for 48 hours.     I discussed with her common implant complications as rippling, animation deformity, rupture, and capsular contracture. Furthermore, I discussed the current cases of anaplastic T cell lymphoma as examples of risks of form-stable and textured implant based reconstruction.     I discussed with the patient that this is reconstructive surgery, and there are no guarantees of results.  Sometimes, patients are dissatisfied and sometimes patients require revision surgery.  We try our best to achieve several goals in surgery, our goals of the surgery are #1 removal of cancer/cancer risk, #2 decrease risk of complication to 0 if possible, #3 are cosmetic/aesthetic result.  We discussed that we plan second stage surgeries, and that radiation will play a role in her overall outcome if it is indicated.      I gave her supplemental reading information regarding our discussion. I had her repeat to me in her own words what we had discussed, and she was able to  do so satisfactorily.    After reviewing our discussion, it is my impression that -she is interested in direct to implant breast reconstruction with structured saline implant, as well as targeted nipple reinnervation      Plan:   Bilateral nipple sparing mastectomy  Direct to implant reconstruction with saline implant  Targeted nipple reinnervation    I spent 60 minutes with this patient. Greater than 50% of this time was spent in the counselling and/or coordination of care of this patient.  This note was created using voice recognition software and was not corrected for typographical or grammatical errors.    Signature: Rex Jimenez MD   Date: 9/23/2024

## 2024-09-25 DIAGNOSIS — D05.01 LOBULAR CARCINOMA IN SITU (LCIS) OF RIGHT BREAST: ICD-10-CM

## 2024-09-25 DIAGNOSIS — Z91.89 AT INCREASED RISK OF BREAST CANCER: ICD-10-CM

## 2024-09-30 ENCOUNTER — PATIENT MESSAGE (OUTPATIENT)
Dept: SURGICAL ONCOLOGY | Facility: HOSPITAL | Age: 44
End: 2024-09-30

## 2024-09-30 ENCOUNTER — APPOINTMENT (OUTPATIENT)
Dept: PLASTIC SURGERY | Facility: CLINIC | Age: 44
End: 2024-09-30
Payer: COMMERCIAL

## 2024-09-30 DIAGNOSIS — Z91.89 AT INCREASED RISK OF BREAST CANCER: Primary | ICD-10-CM

## 2024-09-30 PROCEDURE — 99213 OFFICE O/P EST LOW 20 MIN: CPT | Performed by: SURGERY

## 2024-09-30 NOTE — PROGRESS NOTES
"                Division of Plastic & Reconstructive Surgery         [facility]    Date: 9/30/2024         Subjective   Sarah Welch is a 44 y.o. female who was referred by Dr. Beth Prajapati  for increased lifetime risk of breast cancer.      Asiya is a very pleasant 44-year-old female who is being followed by her breast colleagues for increased lifetime risk of breast cancer, 80% related to LCIS and extremely dense breast tissue and family history.  Most recent imaging was an MRI performed in December showing a probably benign left breast findings without sonographic correlate.  Patient has been on tamoxifen and is noted to have side effects and last visit with our breast colleagues.    Per chart review: 6/30/2021 she had a right breast excisional biopsy demonstrating lobular carcinoma in situ (LCIS) and complex sclerosing lesion. 1/19/2022 she underwent additional right breast MRI core biopsy due to non-mass enhancement. Final pathology demonstrated LCIS, atypical lobular hyperplasia (ALH) and other benign findings. She did not have an additional excision and opted for observation with imaging. She has a remote history of left breast excisional biopsy in 2010, benign. She started Tamoxifen in November 2021 and took for 4 months. She self-discontinued due to intolerable hot flashes and vaginal dryness. She restarted Tamoxifen 10mg daily for three years in July 2023. She was having severe vaginal dryness and \"cuts\". She was started on Estradiol cream. She states the cream helped a little, but was still intolerable and discontinued in July 2024. She is currently being followed for a stable left breast mass noted on ultrasound. She has family history of breast cancer in her mother, age 63 and paternal half cousin, gene positive. She has a history of basal cell carcinoma, surgically excised from arm, chest, leg and face     All other systems have been reviewed with the patient and have been found to be negative with " exception to the chief complaint as mentioned in the history of present illness.    Objective      NO EXAM. VIRTUAL VISIT.   From previous, clinical photography reviewed:    Focused exam of the:                               R                  L  SN:NAC             21 cm              20 cm  NAC:IMF            8 cm             8 cm         BW                    13 cm              13 cm        NAC diam         3.5 cm              3.5 cm                                                                                   Ptosis Grade     0               0                                                       Bra Size         34B    Right nipple has a superior/lateral outer circumareolar incision for previous biopsies  Left nipple has a medial circumareolar incision for biopsy  Both nipples are approximate 1.5 cm pedunculated and protruding       Assessment/Plan       Sarah is a 44 y.o. female who presents for breast reconstruction in the setting of increased lifetime risk for breast cancer, related to history of LCIS, family history and extremely breast dense breasts      We discussed options for Direct to Implant (DTI) reconstruction.  We discussed that indications for this include patients who are healthy, non-smoking patients with small- to moderate-sized breasts who desire to be of similar breast size following reconstruction.  We mentioned that patients who wish to attain a significantly larger breast size are better off with a 2-stage tissue-espander-implant reconstruction.  We stressed that patients undergoing DTI reconstruction should harbor realistic expectations about their range of postoperative breast size and she understands that breast enlargement is not possible.  We discussed that DTI should not be viewed as a single stage reconstruction, but that interval second stage fat grafting should be considered as part of the reconstructive plan.    Patients with prior breast radiation or preexisting scars that  adversely affect mastectomy skin flap perfusion are not considered good candidates for DTI. Similarly, patients with morbid obesity, uncontrolled diabetes mellitus, poorly perfused or thin mastectomy skin flaps, or advanced oncologic disease would best be offered a two-stage approach instead.   Additionally, we discussed mastectomy incision type. When oncologic safety allows, nipple sparing mastectomy is preferred.  The first choice and most favorable NSM incision is the lateral inframammary fold (IMF), as it permits good visualization for the mastectomy and is the least noticeable on frontal view. While large breasts pose a challenge for the oncologic surgeon, this incision has been associated with the lowest rate of nipple necrosis in the literature. We discussed that second and third choices are typically the vertical areolar to IMF incision and the inverted-T incision. Due to challenges in accessing the axilla through the vertical incision, a second incision is sometimes required. The lateral radial incision is the least favored of the incisions. It is directly visible on the breast and can cause retraction ischemia in the mastectomy skin flaps.  We discussed that should there be any doubt regarding the intraoperative viability of the skin flap, the procedure would be modified and an expander placed in the prepectoral pocket, or the reconstruction would be deferred. If tissue expander is placed, the expander can then be injected with a volume sufficient to gently fill the skin envelope (hand-in-glove fit) without causing skin tension.  We would then complete filling of the expander once the skin has healed.  If no expander is placed and reconstruction is abandoned, we would plan to return to OR to perform delayed tissue-expander reconstruction.  We discussed that post-operative drains would be required to drain all dead-space fluid to prevent seroma and limit stress on the skin envelope in the post-operative  period.  Drains are removed when output is less than 30cc for 48 hours.     I discussed with her common implant complications as rippling, animation deformity, rupture, and capsular contracture. Furthermore, I discussed the current cases of anaplastic T cell lymphoma as examples of risks of form-stable and textured implant based reconstruction.     I discussed with the patient that this is reconstructive surgery, and there are no guarantees of results.  Sometimes, patients are dissatisfied and sometimes patients require revision surgery.  We try our best to achieve several goals in surgery, our goals of the surgery are #1 removal of cancer/cancer risk, #2 decrease risk of complication to 0 if possible, #3 are cosmetic/aesthetic result.  We discussed that we plan second stage surgeries, and that radiation will play a role in her overall outcome if it is indicated.      I gave her supplemental reading information regarding our discussion. I had her repeat to me in her own words what we had discussed, and she was able to do so satisfactorily.    After reviewing our discussion, it is my impression that -she is interested in direct to implant breast reconstruction with structured saline implant, as well as targeted nipple reinnervation      Plan:   Bilateral nipple sparing mastectomy  Direct to implant reconstruction with saline implant  Targeted nipple reinnervation    Patient would like to delay until 2025, given her need to pay out of pocket deductibles    I spent 30 minutes with this patient. Greater than 50% of this time was spent in the counselling and/or coordination of care of this patient.  This note was created using voice recognition software and was not corrected for typographical or grammatical errors.    Signature: Rex Jimenez MD   Date: 9/30/2024

## 2024-11-19 ENCOUNTER — PATIENT MESSAGE (OUTPATIENT)
Dept: SURGICAL ONCOLOGY | Facility: HOSPITAL | Age: 44
End: 2024-11-19
Payer: COMMERCIAL

## 2025-02-07 DIAGNOSIS — D05.01 LOBULAR CARCINOMA IN SITU (LCIS) OF RIGHT BREAST: ICD-10-CM

## 2025-02-17 ENCOUNTER — APPOINTMENT (OUTPATIENT)
Dept: PLASTIC SURGERY | Facility: CLINIC | Age: 45
End: 2025-02-17
Payer: COMMERCIAL

## 2025-02-17 VITALS
HEART RATE: 73 BPM | DIASTOLIC BLOOD PRESSURE: 73 MMHG | SYSTOLIC BLOOD PRESSURE: 120 MMHG | HEIGHT: 61 IN | WEIGHT: 129 LBS | BODY MASS INDEX: 24.35 KG/M2

## 2025-02-17 DIAGNOSIS — Z01.818 PRE-OP TESTING: Primary | ICD-10-CM

## 2025-02-17 DIAGNOSIS — Z90.13 STATUS POST BILATERAL MASTECTOMY: ICD-10-CM

## 2025-02-17 PROCEDURE — 1036F TOBACCO NON-USER: CPT | Performed by: PHYSICIAN ASSISTANT

## 2025-02-17 PROCEDURE — 99213 OFFICE O/P EST LOW 20 MIN: CPT | Performed by: PHYSICIAN ASSISTANT

## 2025-02-17 PROCEDURE — 3008F BODY MASS INDEX DOCD: CPT | Performed by: PHYSICIAN ASSISTANT

## 2025-02-17 RX ORDER — NITROGLYCERIN 20 MG/G
OINTMENT TOPICAL
Qty: 30 G | Refills: 0 | Status: SHIPPED | OUTPATIENT
Start: 2025-02-17

## 2025-02-17 RX ORDER — CHLORHEXIDINE GLUCONATE 40 MG/ML
SOLUTION TOPICAL DAILY PRN
Qty: 236 ML | Refills: 0 | Status: SHIPPED | OUTPATIENT
Start: 2025-02-17

## 2025-02-17 NOTE — PROGRESS NOTES
"                Division of Plastic & Reconstructive Surgery         [facility]    Date: 2/17/2025         Subjective   Sarah Welch is a 44 y.o. female who was referred by Dr. Beth Prajapati  for increased lifetime risk of breast cancer.      Asiya is a very pleasant 44-year-old female who is being followed by her breast colleagues for increased lifetime risk of breast cancer, 80% related to LCIS and extremely dense breast tissue and family history.  Most recent imaging was an MRI performed in December showing a probably benign left breast findings without sonographic correlate.  Patient has been on tamoxifen and is noted to have side effects and last visit with our breast colleagues.    Per chart review: 6/30/2021 she had a right breast excisional biopsy demonstrating lobular carcinoma in situ (LCIS) and complex sclerosing lesion. 1/19/2022 she underwent additional right breast MRI core biopsy due to non-mass enhancement. Final pathology demonstrated LCIS, atypical lobular hyperplasia (ALH) and other benign findings. She did not have an additional excision and opted for observation with imaging. She has a remote history of left breast excisional biopsy in 2010, benign. She started Tamoxifen in November 2021 and took for 4 months. She self-discontinued due to intolerable hot flashes and vaginal dryness. She restarted Tamoxifen 10mg daily for three years in July 2023. She was having severe vaginal dryness and \"cuts\". She was started on Estradiol cream. She states the cream helped a little, but was still intolerable and discontinued in July 2024. She is currently being followed for a stable left breast mass noted on ultrasound. She has family history of breast cancer in her mother, age 63 and paternal half cousin, gene positive. She has a history of basal cell carcinoma, surgically excised from arm, chest, leg and face     All other systems have been reviewed with the patient and have been found to be negative with " exception to the chief complaint as mentioned in the history of present illness.    Objective      NO EXAM. VIRTUAL VISIT.   From previous, clinical photography reviewed:    Focused exam of the:                               R                  L  SN:NAC             21 cm              20 cm  NAC:IMF            8 cm             8 cm         BW                    13 cm              13 cm        NAC diam         3.5 cm              3.5 cm                                                                                   Ptosis Grade     0               0                                                       Bra Size         34B    Right nipple has a superior/lateral outer circumareolar incision for previous biopsies  Left nipple has a medial circumareolar incision for biopsy  Both nipples are approximate 1.5 cm pedunculated and protruding       Assessment/Plan       Sarah is a 44 y.o. female who presents for breast reconstruction in the setting of increased lifetime risk for breast cancer, related to history of LCIS, family history and extremely breast dense breasts      We discussed options for Direct to Implant (DTI) reconstruction.  We discussed that indications for this include patients who are healthy, non-smoking patients with small- to moderate-sized breasts who desire to be of similar breast size following reconstruction.  We mentioned that patients who wish to attain a significantly larger breast size are better off with a 2-stage tissue-espander-implant reconstruction.  We stressed that patients undergoing DTI reconstruction should harbor realistic expectations about their range of postoperative breast size and she understands that breast enlargement is not possible.  We discussed that DTI should not be viewed as a single stage reconstruction, but that interval second stage fat grafting should be considered as part of the reconstructive plan.    Patients with prior breast radiation or preexisting scars that  adversely affect mastectomy skin flap perfusion are not considered good candidates for DTI. Similarly, patients with morbid obesity, uncontrolled diabetes mellitus, poorly perfused or thin mastectomy skin flaps, or advanced oncologic disease would best be offered a two-stage approach instead.   Additionally, we discussed mastectomy incision type. When oncologic safety allows, nipple sparing mastectomy is preferred.  The first choice and most favorable NSM incision is the lateral inframammary fold (IMF), as it permits good visualization for the mastectomy and is the least noticeable on frontal view. While large breasts pose a challenge for the oncologic surgeon, this incision has been associated with the lowest rate of nipple necrosis in the literature. We discussed that second and third choices are typically the vertical areolar to IMF incision and the inverted-T incision. Due to challenges in accessing the axilla through the vertical incision, a second incision is sometimes required. The lateral radial incision is the least favored of the incisions. It is directly visible on the breast and can cause retraction ischemia in the mastectomy skin flaps.  We discussed that should there be any doubt regarding the intraoperative viability of the skin flap, the procedure would be modified and an expander placed in the prepectoral pocket, or the reconstruction would be deferred. If tissue expander is placed, the expander can then be injected with a volume sufficient to gently fill the skin envelope (hand-in-glove fit) without causing skin tension.  We would then complete filling of the expander once the skin has healed.  If no expander is placed and reconstruction is abandoned, we would plan to return to OR to perform delayed tissue-expander reconstruction.  We discussed that post-operative drains would be required to drain all dead-space fluid to prevent seroma and limit stress on the skin envelope in the post-operative  period.  Drains are removed when output is less than 30cc for 48 hours.     I discussed with her common implant complications as rippling, animation deformity, rupture, and capsular contracture. Furthermore, I discussed the current cases of anaplastic T cell lymphoma as examples of risks of form-stable and textured implant based reconstruction.     I discussed with the patient that this is reconstructive surgery, and there are no guarantees of results.  Sometimes, patients are dissatisfied and sometimes patients require revision surgery.  We try our best to achieve several goals in surgery, our goals of the surgery are #1 removal of cancer/cancer risk, #2 decrease risk of complication to 0 if possible, #3 are cosmetic/aesthetic result.  We discussed that we plan second stage surgeries, and that radiation will play a role in her overall outcome if it is indicated.      I gave her supplemental reading information regarding our discussion. I had her repeat to me in her own words what we had discussed, and she was able to do so satisfactorily.    After reviewing our discussion, it is my impression that -she is interested in direct to implant breast reconstruction with structured saline implant, as well as targeted nipple reinnervation      Plan:   Bilateral nipple sparing mastectomy--we discussed today that her nipples are protuberant of the skin approximately 2.5 to 2.75 cm, this does increase her risk slightly for partial necrosis of the nipple.  She understands this.  Direct to implant reconstruction with saline implant  Targeted nipple reinnervation was denied      I discussed with the patient that this is reconstructive surgery, and there are no guarantees of results.  Sometimes, patients are dissatisfied and sometimes patients require revision surgery.  If there is a need to return to surgery to correct any problems or complications, we indicated that in some instances, the return to the operating room may not  covered by insurance.  I indicated that I do not charge the patient for return to the operating room, but there WILL BE charges for operating room/facility fees and anesthesia fees, over which we have no control.      Patient is indicated for above-stated procedure    Consent obtained today      I spent 30 minutes with this patient. Greater than 50% of this time was spent in the counselling and/or coordination of care of this patient.  This note was created using voice recognition software and was not corrected for typographical or grammatical errors.    Signature: Rex Jimenez MD   Date: 2/17/2025

## 2025-02-20 ENCOUNTER — APPOINTMENT (OUTPATIENT)
Dept: PREADMISSION TESTING | Facility: HOSPITAL | Age: 45
End: 2025-02-20
Payer: COMMERCIAL

## 2025-02-21 NOTE — CPM/PAT H&P
CPM/PAT Evaluation       Name: Sarah Welch (Sarah Welch)  /Age: 1980/44 y.o.     In-Person       Chief Complaint: high risk for breast cancer    HPI  This is a very pleasant 43 yo with PmHx of GERD, and LCIS.  Pt reports she has a high risk of breast cancer.  Plan is for bilateral risk reducing mastectomy with Dr. Prajapati on .  Pt denies recent fever or chills.    Past Medical History:   Diagnosis Date    Dizziness     GERD (gastroesophageal reflux disease)     Lobular carcinoma in situ (LCIS) of right breast     Personal history of other malignant neoplasm of skin     History of basal cell carcinoma       Past Surgical History:   Procedure Laterality Date    ADENOIDECTOMY      BREAST BIOPSY      TONSILLECTOMY         Patient  reports being sexually active and has had partner(s) who are male.    Family History   Problem Relation Name Age of Onset    Seizures Mother      Breast cancer Mother      Hypertension Father      Diabetes type II Father         Allergies   Allergen Reactions    Penicillins Hives    Phenergan [Promethazine] Agitation    Tea Tree Oil Hives       Prior to Admission medications    Medication Sig Start Date End Date Taking? Authorizing Provider   chlorhexidine (Hibiclens) 4 % external liquid Apply topically once daily as needed for wound care. 25   Monica Garduno PA-C   montelukast (Singulair) 4 mg chewable tablet Chew.    Historical Provider, MD   nitroglycerin (Nitro-Bid) 2 % ointment Apply to nipples and breast skin daily after showering. Wash hands after application. Cover the breast with gauze. 25   Monica Garduno PA-C   tamoxifen (Nolvadex) 10 mg tablet Take 1 tablet (10 mg total) by mouth once daily. 24   AINSLEY Singh-CNP        PAT ROS:   Constitutional:   neg    Neuro/Psych:   neg    Eyes:   neg     use of corrective lenses  Ears:   neg    Nose:   neg    Mouth:   neg    Throat:   neg    Neck:    No carotid bruit auscultated  neg     Cardio:   neg    Respiratory:    Uses singulair and MDI when gets episodes of bronchitis  Endocrine:   neg    GI:   neg    :   neg    Musculoskeletal:   neg    Hematologic:   neg    Skin:  neg    Breast: pt is ciera risk for breast cancer due to hx of LCIS    Physical Exam  Constitutional:       Appearance: Normal appearance.   HENT:      Head: Normocephalic and atraumatic.      Nose: Nose normal.      Mouth/Throat:      Mouth: Mucous membranes are moist.   Eyes:      Pupils: Pupils are equal, round, and reactive to light.   Neck:      Comments: No carotid bruit auscultated  Cardiovascular:      Rate and Rhythm: Normal rate and regular rhythm.   Pulmonary:      Effort: Pulmonary effort is normal.      Breath sounds: Normal breath sounds.   Abdominal:      General: Bowel sounds are normal.      Palpations: Abdomen is soft.   Musculoskeletal:      Cervical back: Normal range of motion.      Comments: No LE edema   Skin:     General: Skin is warm and dry.   Neurological:      General: No focal deficit present.      Mental Status: She is alert and oriented to person, place, and time.   Psychiatric:         Mood and Affect: Mood normal.         Behavior: Behavior normal.          Airway: nl neck ROM  Anesthesia:  Patient denies any anesthesia complications.   Teeth: intact    Testing/Diagnostic:   Recent Results (from the past week)   CBC    Collection Time: 02/25/25  8:35 AM   Result Value Ref Range    WBC 6.7 4.4 - 11.3 x10*3/uL    nRBC 0.0 0.0 - 0.0 /100 WBCs    RBC 4.31 4.00 - 5.20 x10*6/uL    Hemoglobin 13.0 12.0 - 16.0 g/dL    Hematocrit 39.6 36.0 - 46.0 %    MCV 92 80 - 100 fL    MCH 30.2 26.0 - 34.0 pg    MCHC 32.8 32.0 - 36.0 g/dL    RDW 13.4 11.5 - 14.5 %    Platelets 352 150 - 450 x10*3/uL   Basic metabolic panel    Collection Time: 02/25/25  8:35 AM   Result Value Ref Range    Glucose 81 74 - 99 mg/dL    Sodium 137 136 - 145 mmol/L    Potassium 4.4 3.5 - 5.3 mmol/L    Chloride 102 98 - 107 mmol/L     "Bicarbonate 29 21 - 32 mmol/L    Anion Gap 10 10 - 20 mmol/L    Urea Nitrogen 14 6 - 23 mg/dL    Creatinine 0.90 0.50 - 1.05 mg/dL    eGFR 81 >60 mL/min/1.73m*2    Calcium 8.9 8.6 - 10.3 mg/dL       Patient Specialist/PCP: Dr Reddy    Visit Vitals  /84   Pulse 88   Temp 36 °C (96.8 °F) (Temporal)   Resp 18   Ht 1.549 m (5' 1\")   Wt 58.8 kg (129 lb 10.1 oz)   SpO2 99%   BMI 24.49 kg/m²   OB Status Having periods   Smoking Status Never   BSA 1.59 m²       DASI Risk Score      Flowsheet Row Pre-Admission Testing from 2/25/2025 in [facility] [place]   Can you take care of yourself (eat, dress, bathe, or use toilet)?  2.75 filed at 02/24/2025 1023   Can you walk indoors, such as around your house? 1.75 filed at 02/24/2025 1023   Can you walk a block or two on level ground?  2.75 filed at 02/24/2025 1023   Can you climb a flight of stairs or walk up a hill? 5.5 filed at 02/24/2025 1023   Can you run a short distance? 8 filed at 02/24/2025 1023   Can you do light work around the house like dusting or washing dishes? 2.7 filed at 02/24/2025 1023   Can you do moderate work around the house like vacuuming, sweeping floors or carrying groceries? 3.5 filed at 02/24/2025 1023   Can you do heavy work around the house like scrubbing floors or lifting and moving heavy furniture?  8 filed at 02/24/2025 1023   Can you do yard work like raking leaves, weeding or pushing a mower? 4.5 filed at 02/24/2025 1023   Can you have sexual relations? 5.25 filed at 02/24/2025 1023   Can you participate in moderate recreational activities like golf, bowling, dancing, doubles tennis or throwing a baseball or football? 6 filed at 02/24/2025 1023   Can you participate in strenous sports like swimming, singles tennis, football, basketball, or skiing? 0 filed at 02/24/2025 1023   DASI SCORE 50.7 filed at 02/24/2025 1023   METS Score (Will be calculated only when all the questions are answered) 9 filed at 02/24/2025 1023      "     Caprini DVT Assessment      Flowsheet Row Pre-Admission Testing from 2/25/2025 in [facility] [place]   DVT Score (IF A SCORE IS NOT CALCULATING, MUST SELECT A BMI TO COMPLETE) 12 filed at 02/24/2025 1021   Medical Factors Family history of DVT/PE, Present cancer, chemotherapy, or previous malignancy  [grandfather PE, grandmother, Aunts - brain aneurysm] filed at 02/24/2025 1021   Surgical Factors Major surgery planned, lasting over 3 hours filed at 02/24/2025 1021   BMI (BMI MUST BE CHOSEN) 30 or less filed at 02/24/2025 1021          Modified Frailty Index      Flowsheet Row Pre-Admission Testing from 2/25/2025 in [facility] [place]   Non-independent functional status (problems with dressing, bathing, personal grooming, or cooking) 0 filed at 02/24/2025 1023   History of diabetes mellitus  0 filed at 02/24/2025 1023   History of COPD 0 filed at 02/24/2025 1023   History of CHF No filed at 02/24/2025 1023   History of MI 0 filed at 02/24/2025 1023   History of Percutaneous Coronary Intervention, Cardiac Surgery, or Angina No filed at 02/24/2025 1023   Hypertension requiring the use of medication  0 filed at 02/24/2025 1023   Peripheral vascular disease 0 filed at 02/24/2025 1023   Impaired sensorium (cognitive impairement or loss, clouding, or delirium) 0 filed at 02/24/2025 1023   TIA or CVA withouy residual deficit 0 filed at 02/24/2025 1023   Cerebrovascular accident with deficit 0 filed at 02/24/2025 1023   Modified Frailty Index Calculator 0 filed at 02/24/2025 1023          CHADS2 Stroke Risk  Current as of 15 minutes ago        N/A 3 to 100%: High Risk   2 to < 3%: Medium Risk   0 to < 2%: Low Risk     Last Change: N/A          This score determines the patient's risk of having a stroke if the patient has atrial fibrillation.        This score is not applicable to this patient. Components are not calculated.          Revised Cardiac Risk Index      Flowsheet Row Pre-Admission Testing from  2/25/2025 in [facility] [place]   High-Risk Surgery (Intraperitoneal, Intrathoracic,Suprainguinal vascular) 0 filed at 02/24/2025 1023   History of ischemic heart disease (History of MI, History of positive exercuse test, Current chest paint considered due to myocardial ischemia, Use of nitrate therapy, ECG with pathological Q Waves) 0 filed at 02/24/2025 1023   History of congestive heart failure (pulmonary edemia, bilateral rales or S3 gallop, Paroxysmal nocturnal dyspnea, CXR showing pulmonary vascular redistribution) 0 filed at 02/24/2025 1023   History of cerebrovascular disease (Prior TIA or stroke) 0 filed at 02/24/2025 1023   Pre-operative insulin treatment 0 filed at 02/24/2025 1023   Pre-operative creatinine>2 mg/dl 0 filed at 02/24/2025 1023   Revised Cardiac Risk Calculator 0 filed at 02/24/2025 1023          Apfel Simplified Score      Flowsheet Row Pre-Admission Testing from 2/25/2025 in [facility]   Smoking status 1 filed at 02/24/2025 1023   History of motion sickness or PONV  1 filed at 02/24/2025 1023   Use of postoperative opioids 0 filed at 02/24/2025 1023   Gender - Female 1=Yes filed at 02/24/2025 1023   Apfel Simplified Score Calculator 3 filed at 02/24/2025 1023          Risk Analysis Index Results This Encounter         2/24/2025  1024             Do you live in a place other than your own home?: 0    When did you begin living in the place you are currently residing?: Greater than one year ago    Any kidney failure, kidney not working well, or seeing a kidney doctor (nephrologist)? If yes, was this for kidney stones or another problem?: 0 No    Any history of chronic (long-term) congestive heart failure (CHF)?: 0 No    Any shortness of breath when resting?: 0 No    In the past five years, have you been diagnosed with or treated for cancer?: No    During the last 3 months has it become difficult for you to remember things or organize your thoughts?: 0 No    Have you  lost weight of 10 pounds or more in the past 3 months without trying?: 0 No    Do you have any loss of appetitie?: 0 No    Getting Around (Mobility): 0 Can get around without help    Eatin Can plan and prepare own meals    Toiletin Can use toilet without any help    Personal Hygiene (Bathing, Hand Washing, Changing Clothes): 0 Can shower or bathe without any help    DE LEON Cancer History: Patient does not indicate history of cancer    Total Risk Analysis Index Score Without Cancer: 12    Total Risk Analysis Index Score: 12          Stop Bang Score      Flowsheet Row [place] [facility] [place]   Do you snore loudly? 0 filed at 2025 1022   Do you often feel tired or fatigued after your sleep? 0 filed at 2025 1022   Has anyone ever observed you stop breathing in your sleep? 0 filed at 2025 1022   Do you have or are you being treated for high blood pressure? 0 filed at 2025 1022   Recent BMI (Calculated) 24.4 filed at 2025 1022   Is BMI greater than 35 kg/m2? 0=No filed at 2025 1022   Age older than 50 years old? 0=No filed at 2025 1022   Is your neck circumference greater than 17 inches (Male) or 16 inches (Female)? 0 filed at 2025 1022   Gender - Male 0=No filed at 2025 1022   STOP-BANG Total Score 0 filed at 2025 1022          Prodigy: High Risk  Total Score: 0          ARISCAT Score for Postoperative Pulmonary Complications      Flowsheet Row [place] [facility]   Age Calculated Score 0 filed at 2025 1024   Preoperative SpO2 0 filed at 2025 1024   Respiratory infection in the last month Either upper or lower (i.e., URI, bronchitis, pneumonia), with fever and antibiotic treatment 0 filed at 2025 1024   Preoperative anemia (Hgb less than 10 g/dl) 0 filed at 2025 1024   Surgical incision  0 filed at 2025 1024   Duration of surgery  23 filed  at 2025 1024   Emergency Procedure  0 filed at 2025 1024   ARISCAT Total Score  23 filed at 2025 1024          Martina Perioperative Risk for Myocardial Infarction or Cardiac Arrest (VAHID)      Flowsheet Row Pre-Admission Testing from 2025 in [facility]   Calculated Age Score 0.9 filed at 2025 1024   Functional Status  0 filed at 2025 1024   ASA Class  -5.17 filed at 2025 1024   Creatinine 0 filed at 2025 1024   Type of Procedure  -1.61 filed at 2025 1024   VAHID Total Score  -11.13 filed at 2025 1024   VAHID % 0 filed at 2025 1024            Assessment and Plan:     Plan for OR on  for   BILATERAL NIPPLE SPARING MASTECTOMY [14222 (CPT®)] Bilateral General        Panel 2    Surgeon Role   Rex Jimenez MD Primary    Procedure Laterality Anesthesia   RECONSTRUCTION, BREAST, BILATERAL, IMMEDIATE, WITH BREAST IMPLANT INSERTION [87940 (CPT®)] Bilateral General      Due to   Lobular carcinoma in situ (LCIS) of right breast   At increased risk of breast cancer   BMP, CBC      HEENT/Airway:  no significant findings on chart review or clinical presentation    Cardiovascular:  no significant findings on chart review or clinical presentation  DASI  [facility] Activity Status Index (DASI)  DASI Score: 50.7   MET Score: 9  RCRI: 0 points, 3.9%  risk for postoperative MACE   VAHID: 0% risk for postoperative MACE      Pulmonary:  no significant findings on chart review or clinical presentation    Preoperative deep breathing educational handout provided to patient.  ARISCAT:  23    points which is a low (1.6%) risk of in-hospital post-op pulmonary complications   STOP BAN   points which is a low risk for moderate to severe LIO    Renal: no significant findings on chart review or clinical presentation    Endocrine:  no significant findings on chart review or clinical presentation    Hematologic:   no significant findings on chart review or clinical  presentation  Preoperative DVT educational handout provided to patient.  Caprini Score: 12   points which is a highest risk of perioperative VTE    Gastrointestinal:   no significant findings on chart review or clinical presentation  Apfel: 3 points 61% risk for post operative N/V    Infectious disease:   Treated for URI in January currently no symptoms     Musculoskeletal:  no acute active issues    Neuro:    No neurologic diagnosesPreoperative brain exercise educational handout provided to patient.    The patient is at an increased risk for perioperative stroke secondary to female sex  and general anesthesia.

## 2025-02-22 RX ORDER — ALBUTEROL SULFATE 90 UG/1
2 INHALANT RESPIRATORY (INHALATION) EVERY 6 HOURS PRN
COMMUNITY

## 2025-02-24 ASSESSMENT — DUKE ACTIVITY SCORE INDEX (DASI)
CAN YOU PARTICIPATE IN MODERATE RECREATIONAL ACTIVITIES LIKE GOLF, BOWLING, DANCING, DOUBLES TENNIS OR THROWING A BASEBALL OR FOOTBALL: YES
CAN YOU DO LIGHT WORK AROUND THE HOUSE LIKE DUSTING OR WASHING DISHES: YES
TOTAL_SCORE: 50.7
CAN YOU DO HEAVY WORK AROUND THE HOUSE LIKE SCRUBBING FLOORS OR LIFTING AND MOVING HEAVY FURNITURE: YES
CAN YOU RUN A SHORT DISTANCE: YES
CAN YOU TAKE CARE OF YOURSELF (EAT, DRESS, BATHE, OR USE TOILET): YES
CAN YOU WALK INDOORS, SUCH AS AROUND YOUR HOUSE: YES
CAN YOU HAVE SEXUAL RELATIONS: YES
CAN YOU WALK A BLOCK OR TWO ON LEVEL GROUND: YES
CAN YOU DO MODERATE WORK AROUND THE HOUSE LIKE VACUUMING, SWEEPING FLOORS OR CARRYING GROCERIES: YES
CAN YOU DO YARD WORK LIKE RAKING LEAVES, WEEDING OR PUSHING A MOWER: YES
DASI METS SCORE: 9
CAN YOU PARTICIPATE IN STRENOUS SPORTS LIKE SWIMMING, SINGLES TENNIS, FOOTBALL, BASKETBALL, OR SKIING: NO
CAN YOU CLIMB A FLIGHT OF STAIRS OR WALK UP A HILL: YES

## 2025-02-24 ASSESSMENT — ACTIVITIES OF DAILY LIVING (ADL): ADL_SCORE: 0

## 2025-02-24 ASSESSMENT — LIFESTYLE VARIABLES: SMOKING_STATUS: NONSMOKER

## 2025-02-25 ENCOUNTER — APPOINTMENT (OUTPATIENT)
Dept: LAB | Facility: HOSPITAL | Age: 45
End: 2025-02-25
Payer: COMMERCIAL

## 2025-02-25 ENCOUNTER — PRE-ADMISSION TESTING (OUTPATIENT)
Dept: PREADMISSION TESTING | Facility: HOSPITAL | Age: 45
End: 2025-02-25
Payer: COMMERCIAL

## 2025-02-25 VITALS
WEIGHT: 129.63 LBS | TEMPERATURE: 96.8 F | DIASTOLIC BLOOD PRESSURE: 84 MMHG | HEIGHT: 61 IN | SYSTOLIC BLOOD PRESSURE: 135 MMHG | BODY MASS INDEX: 24.47 KG/M2 | HEART RATE: 88 BPM | OXYGEN SATURATION: 99 % | RESPIRATION RATE: 18 BRPM

## 2025-02-25 DIAGNOSIS — Z01.818 PRE-OP TESTING: Primary | ICD-10-CM

## 2025-02-25 LAB
ANION GAP SERPL CALC-SCNC: 10 MMOL/L (ref 10–20)
BUN SERPL-MCNC: 14 MG/DL (ref 6–23)
CALCIUM SERPL-MCNC: 8.9 MG/DL (ref 8.6–10.3)
CHLORIDE SERPL-SCNC: 102 MMOL/L (ref 98–107)
CO2 SERPL-SCNC: 29 MMOL/L (ref 21–32)
CREAT SERPL-MCNC: 0.9 MG/DL (ref 0.5–1.05)
EGFRCR SERPLBLD CKD-EPI 2021: 81 ML/MIN/1.73M*2
ERYTHROCYTE [DISTWIDTH] IN BLOOD BY AUTOMATED COUNT: 13.4 % (ref 11.5–14.5)
GLUCOSE SERPL-MCNC: 81 MG/DL (ref 74–99)
HCT VFR BLD AUTO: 39.6 % (ref 36–46)
HGB BLD-MCNC: 13 G/DL (ref 12–16)
MCH RBC QN AUTO: 30.2 PG (ref 26–34)
MCHC RBC AUTO-ENTMCNC: 32.8 G/DL (ref 32–36)
MCV RBC AUTO: 92 FL (ref 80–100)
NRBC BLD-RTO: 0 /100 WBCS (ref 0–0)
PLATELET # BLD AUTO: 352 X10*3/UL (ref 150–450)
POTASSIUM SERPL-SCNC: 4.4 MMOL/L (ref 3.5–5.3)
RBC # BLD AUTO: 4.31 X10*6/UL (ref 4–5.2)
SODIUM SERPL-SCNC: 137 MMOL/L (ref 136–145)
WBC # BLD AUTO: 6.7 X10*3/UL (ref 4.4–11.3)

## 2025-02-25 PROCEDURE — 85027 COMPLETE CBC AUTOMATED: CPT

## 2025-02-25 PROCEDURE — 99202 OFFICE O/P NEW SF 15 MIN: CPT | Performed by: NURSE PRACTITIONER

## 2025-02-25 PROCEDURE — 80048 BASIC METABOLIC PNL TOTAL CA: CPT

## 2025-02-25 ASSESSMENT — ENCOUNTER SYMPTOMS
MUSCULOSKELETAL NEGATIVE: 1
CONSTITUTIONAL NEGATIVE: 1
ENDOCRINE NEGATIVE: 1
CARDIOVASCULAR NEGATIVE: 1
EYES NEGATIVE: 1
NEUROLOGICAL NEGATIVE: 1
GASTROINTESTINAL NEGATIVE: 1
NECK NEGATIVE: 1

## 2025-02-25 ASSESSMENT — PAIN SCALES - GENERAL: PAINLEVEL_OUTOF10: 0 - NO PAIN

## 2025-02-25 ASSESSMENT — PAIN - FUNCTIONAL ASSESSMENT: PAIN_FUNCTIONAL_ASSESSMENT: 0-10

## 2025-02-25 NOTE — PREPROCEDURE INSTRUCTIONS
Thank you for visiting [facility]read[facility] at [facility]. If you have any changes to your health condition, please call the SURGEON's office to alert them and give them details of your symptoms.        Preoperative Brain Exercises    What are brain exercises?  A brain exercise is any activity that engages your thinking (cognitive) skills.    What types of activities are considered brain exercises?  Jigsaw puzzles, crossword puzzles, word jumble, memory games, word search, and many more.  Many can be found free online or on your phone via a mobile hector.    Why should I do brain exercises before my surgery?  More recent research has shown brain exercise before surgery can lower the risk of postoperative delirium (confusion) which can be especially important for older adults.  Patients who did brain exercises for 5 to 10 hours the days before surgery, cut their risk of postoperative delirium in half up to 1 week after surgery.      Preoperative Deep Breathing Exercises    Why it is important to do deep breathing exercises before my surgery?  Deep breathing exercises strengthen your breathing muscles.  This helps you to recover after your surgery and decreases the chance of breathing complications.    How are the deep breathing exercises done?  Sit straight with your back supported.  Breathe in deeply and slowly through your nose. Your lower rib cage should expand and your abdomen may move forward.  Hold that breath for 3 to 5 seconds.  Breathe out through pursed lips, slowly and completely.  Rest and repeat 10 times every hour while awake.  Rest longer if you become dizzy or lightheaded.      Patient and Family Education   Ways You Can Help Prevent Blood Clots     This handout explains some simple things you can do to help prevent blood clots.      Blood clots are blockages that can form in the body's veins. When a blood clot forms in your deep veins, it may be called a deep vein thrombosis, or DVT for short. Blood clots  can happen in any part of the body where blood flows, but they are most common in the arms and legs. If a piece of a blood clot breaks free and travels to the lungs, it is called a pulmonary embolus (PE). A PE can be a very serious problem.      Being in the hospital or having surgery can raise your chances of getting a blood clot because you may not be well enough to move around as much as you normally do.      Ways you can help prevent blood clots in the hospital         Wearing SCDs. SCDs stands for Sequential Compression Devices.   SCDs are special sleeves that wrap around your legs  They attach to a pump that fills them with air to gently squeeze your legs every few minutes.   This helps return the blood in your legs to your heart.   SCDs should only be taken off when walking or bathing.   SCDs may not be comfortable, but they can help save your life.               Wearing compression stockings - if your doctor orders them. These special snug fitting stockings gently squeeze your legs to help blood flow.       Walking. Walking helps move the blood in your legs.   If your doctor says it is ok, try walking the halls at least   5 times a day. Ask us to help you get up, so you don't fall.      Taking any blood thinning medicines your doctor orders.          ©[facility]; 3/23        Ways you can help prevent blood clots at home       Wearing compression stockings - if your doctor orders them. ? Walking - to help move the blood in your legs.       Taking any blood thinning medicines your doctor orders.      Signs of a blood clot or PE      Tell your doctor or nurse know right away if you have of the problems listed below.    If you are at home, seek medical care right away. Call 911 for chest pain or problems breathing.          Signs of a blood clot (DVT) - such as pain,  swelling, redness or warmth in your arm or leg      Signs of a pulmonary embolism (PE) - such as chest     pain or feeling short of  breath

## 2025-02-25 NOTE — PREPROCEDURE INSTRUCTIONS
Medication List            Accurate as of February 25, 2025  8:27 AM. Always use your most recent med list.                albuterol 90 mcg/actuation inhaler  Medication Adjustments for Surgery: Take/Use as prescribed     chlorhexidine 4 % external liquid  Commonly known as: Hibiclens  Apply topically once daily as needed for wound care.  Medication Adjustments for Surgery: Take/Use as prescribed     montelukast 10 mg tablet  Commonly known as: Singulair  Medication Adjustments for Surgery: Take/Use as prescribed     Nitro-Bid 2 % ointment  Generic drug: nitroglycerin  Apply to nipples and breast skin daily after showering. Wash hands after application. Cover the breast with gauze.  Medication Adjustments for Surgery: Take/Use as prescribed                            PRE-OPERATIVE INSTRUCTIONS    You will receive notification one business day prior to your procedure to confirm your arrival time. It is important that you answer your phone and/or check your messages during this time. If you do not hear from the surgery center by 5 pm. the day before your procedure, please call [phone].     Please enter the building through the Outpatient entrance and take the elevator off the lobby to the 2nd floor then check in at the Outpatient Surgery desk on the 2nd floor.    INSTRUCTIONS:  Talk to your surgeon for instructions if you should stop your aspirin, blood thinner, or diabetes medicines.  DO NOT take any multivitamins or over the counter supplements for 7-10 days before surgery.  If not being admitted, you must have an adult immediately available to drive you home after surgery. We also highly recommend you have someone stay with you for the entire day and night of your surgery.  For children having surgery, a parent or legal guardian must accompany them to the surgery center. If this is not possible, please call [phone] to make additional arrangements.  For adults who are unable to consent or make medical  decisions for themselves, a legal guardian or Power of  must accompany them to the surgery center. If this is not possible, please call [phone] to make additional arrangements.  Wear comfortable, loose fitting clothing.  All jewelry and piercings must be removed. If you are unable to remove an item or have a dermal piercing, please be sure to tell the nurse when you arrive for surgery.  Nail polish and make-up must be removed.  Avoid smoking or consuming alcohol for 24 hours before surgery.  To help prevent infection, please take a shower/bath and wash your hair the night before and/or morning of surgery (or follow other specific bathing instructions provided).    Preoperative Fasting Guidelines    Why must I stop eating and drinking near surgery time?  With sedation, food or liquid in your stomach can enter your lungs causing serious complications  Increases nausea and vomiting    When do I need to stop eating and drinking before my surgery?  Do not eat any solid food after midnight the night before your surgery/procedure unless otherwise instructed by your surgeon.   You may have up to 13.5 ounces of clear liquid until TWO hours before your instructed arrival time to the hospital.  This includes water, black tea/coffee, (no milk or cream) apple juice, and electrolyte drinks (Gatorade).   You may chew gum until TWO hours before your surgery/procedure      If applicable, notify your surgeons office immediately of any new skin changes that occur to the surgical limb.      If you have any questions or concerns, please call Pre-Admission Testing at [phone].

## 2025-02-27 ENCOUNTER — PATIENT MESSAGE (OUTPATIENT)
Dept: SURGICAL ONCOLOGY | Facility: HOSPITAL | Age: 45
End: 2025-02-27
Payer: COMMERCIAL

## 2025-02-27 ENCOUNTER — ANESTHESIA EVENT (OUTPATIENT)
Dept: OPERATING ROOM | Facility: HOSPITAL | Age: 45
End: 2025-02-27
Payer: COMMERCIAL

## 2025-02-28 ENCOUNTER — HOSPITAL ENCOUNTER (OUTPATIENT)
Facility: HOSPITAL | Age: 45
Discharge: HOME | End: 2025-03-01
Attending: SURGERY | Admitting: SURGERY
Payer: COMMERCIAL

## 2025-02-28 ENCOUNTER — ANESTHESIA (OUTPATIENT)
Dept: OPERATING ROOM | Facility: HOSPITAL | Age: 45
End: 2025-02-28
Payer: COMMERCIAL

## 2025-02-28 DIAGNOSIS — D05.01 LOBULAR CARCINOMA IN SITU (LCIS) OF RIGHT BREAST: ICD-10-CM

## 2025-02-28 DIAGNOSIS — G89.18 POST-OP PAIN: Primary | ICD-10-CM

## 2025-02-28 DIAGNOSIS — Z91.89 AT INCREASED RISK OF BREAST CANCER: ICD-10-CM

## 2025-02-28 PROBLEM — C50.919 BREAST CANCER IN FEMALE: Status: ACTIVE | Noted: 2025-02-28

## 2025-02-28 LAB — HCG UR QL IA.RAPID: NEGATIVE

## 2025-02-28 PROCEDURE — 19303 MAST SIMPLE COMPLETE: CPT | Performed by: SURGERY

## 2025-02-28 PROCEDURE — 2500000004 HC RX 250 GENERAL PHARMACY W/ HCPCS (ALT 636 FOR OP/ED): Performed by: ANESTHESIOLOGY

## 2025-02-28 PROCEDURE — 7100000002 HC RECOVERY ROOM TIME - EACH INCREMENTAL 1 MINUTE: Performed by: SURGERY

## 2025-02-28 PROCEDURE — 3700000002 HC GENERAL ANESTHESIA TIME - EACH INCREMENTAL 1 MINUTE: Performed by: SURGERY

## 2025-02-28 PROCEDURE — 2500000004 HC RX 250 GENERAL PHARMACY W/ HCPCS (ALT 636 FOR OP/ED)

## 2025-02-28 PROCEDURE — 7100000011 HC EXTENDED STAY RECOVERY HOURLY - NURSING UNIT

## 2025-02-28 PROCEDURE — 2500000001 HC RX 250 WO HCPCS SELF ADMINISTERED DRUGS (ALT 637 FOR MEDICARE OP): Performed by: PHYSICIAN ASSISTANT

## 2025-02-28 PROCEDURE — 2500000004 HC RX 250 GENERAL PHARMACY W/ HCPCS (ALT 636 FOR OP/ED): Performed by: PHYSICIAN ASSISTANT

## 2025-02-28 PROCEDURE — 81025 URINE PREGNANCY TEST: CPT | Performed by: SURGERY

## 2025-02-28 PROCEDURE — 96372 THER/PROPH/DIAG INJ SC/IM: CPT | Performed by: PHYSICIAN ASSISTANT

## 2025-02-28 PROCEDURE — 88307 TISSUE EXAM BY PATHOLOGIST: CPT | Performed by: PATHOLOGY

## 2025-02-28 PROCEDURE — 3600000009 HC OR TIME - EACH INCREMENTAL 1 MINUTE - PROCEDURE LEVEL FOUR: Performed by: SURGERY

## 2025-02-28 PROCEDURE — 88305 TISSUE EXAM BY PATHOLOGIST: CPT | Performed by: PATHOLOGY

## 2025-02-28 PROCEDURE — 2780000003 HC OR 278 NO HCPCS: Performed by: SURGERY

## 2025-02-28 PROCEDURE — 3700000001 HC GENERAL ANESTHESIA TIME - INITIAL BASE CHARGE: Performed by: SURGERY

## 2025-02-28 PROCEDURE — 2500000005 HC RX 250 GENERAL PHARMACY W/O HCPCS: Performed by: PHYSICIAN ASSISTANT

## 2025-02-28 PROCEDURE — 19340 INSJ BREAST IMPLT SM D MAST: CPT | Performed by: SURGERY

## 2025-02-28 PROCEDURE — 14001 TIS TRNFR TRUNK 10.1-30SQCM: CPT | Performed by: SURGERY

## 2025-02-28 PROCEDURE — 2500000005 HC RX 250 GENERAL PHARMACY W/O HCPCS: Performed by: ANESTHESIOLOGY

## 2025-02-28 PROCEDURE — 2500000004 HC RX 250 GENERAL PHARMACY W/ HCPCS (ALT 636 FOR OP/ED): Performed by: SURGERY

## 2025-02-28 PROCEDURE — C1789 PROSTHESIS, BREAST, IMP: HCPCS | Performed by: SURGERY

## 2025-02-28 PROCEDURE — 7100000001 HC RECOVERY ROOM TIME - INITIAL BASE CHARGE: Performed by: SURGERY

## 2025-02-28 PROCEDURE — 88307 TISSUE EXAM BY PATHOLOGIST: CPT | Mod: TC,STJLAB | Performed by: SURGERY

## 2025-02-28 PROCEDURE — 3600000004 HC OR TIME - INITIAL BASE CHARGE - PROCEDURE LEVEL FOUR: Performed by: SURGERY

## 2025-02-28 PROCEDURE — 15860 IV NJX TST VASC FLO FLAP/GRF: CPT | Performed by: SURGERY

## 2025-02-28 PROCEDURE — 2720000007 HC OR 272 NO HCPCS: Performed by: SURGERY

## 2025-02-28 PROCEDURE — C1781 MESH (IMPLANTABLE): HCPCS | Performed by: SURGERY

## 2025-02-28 PROCEDURE — RXMED WILLOW AMBULATORY MEDICATION CHARGE

## 2025-02-28 PROCEDURE — 15777 ACELLULAR DERM MATRIX IMPLT: CPT | Performed by: SURGERY

## 2025-02-28 DEVICE — ROUND, SMOOTH-SURFACE, SALINE-FILLED BREAST IMPLANT.
Type: IMPLANTABLE DEVICE | Site: BREAST | Status: FUNCTIONAL
Brand: IDEAL IMPLANT

## 2025-02-28 DEVICE — MESH, TIGR MATRIX, 20X30, RESORABLE: Type: IMPLANTABLE DEVICE | Site: BREAST | Status: FUNCTIONAL

## 2025-02-28 DEVICE — FILL KIT, UNIVERSAL, IMPLANT, MAMMARY: Type: IMPLANTABLE DEVICE | Site: BREAST | Status: NON-FUNCTIONAL

## 2025-02-28 RX ORDER — DEXAMETHASONE SODIUM PHOSPHATE 10 MG/ML
INJECTION INTRAMUSCULAR; INTRAVENOUS AS NEEDED
Status: DISCONTINUED | OUTPATIENT
Start: 2025-02-28 | End: 2025-02-28

## 2025-02-28 RX ORDER — METOCLOPRAMIDE HYDROCHLORIDE 5 MG/ML
10 INJECTION INTRAMUSCULAR; INTRAVENOUS ONCE AS NEEDED
Status: COMPLETED | OUTPATIENT
Start: 2025-02-28 | End: 2025-02-28

## 2025-02-28 RX ORDER — HYDRALAZINE HYDROCHLORIDE 20 MG/ML
5 INJECTION INTRAMUSCULAR; INTRAVENOUS EVERY 30 MIN PRN
Status: DISCONTINUED | OUTPATIENT
Start: 2025-02-28 | End: 2025-02-28 | Stop reason: HOSPADM

## 2025-02-28 RX ORDER — SULFAMETHOXAZOLE AND TRIMETHOPRIM 800; 160 MG/1; MG/1
1 TABLET ORAL 2 TIMES DAILY
Qty: 28 TABLET | Refills: 0 | Status: SHIPPED | OUTPATIENT
Start: 2025-02-28 | End: 2025-03-14

## 2025-02-28 RX ORDER — PHENYLEPHRINE HCL IN 0.9% NACL 0.4MG/10ML
SYRINGE (ML) INTRAVENOUS AS NEEDED
Status: DISCONTINUED | OUTPATIENT
Start: 2025-02-28 | End: 2025-02-28

## 2025-02-28 RX ORDER — ONDANSETRON HYDROCHLORIDE 2 MG/ML
INJECTION, SOLUTION INTRAVENOUS AS NEEDED
Status: DISCONTINUED | OUTPATIENT
Start: 2025-02-28 | End: 2025-02-28

## 2025-02-28 RX ORDER — HYDROCODONE BITARTRATE AND ACETAMINOPHEN 5; 325 MG/1; MG/1
1 TABLET ORAL EVERY 4 HOURS PRN
Status: DISCONTINUED | OUTPATIENT
Start: 2025-02-28 | End: 2025-02-28 | Stop reason: HOSPADM

## 2025-02-28 RX ORDER — MAGNESIUM SULFATE HEPTAHYDRATE 500 MG/ML
INJECTION, SOLUTION INTRAMUSCULAR; INTRAVENOUS AS NEEDED
Status: DISCONTINUED | OUTPATIENT
Start: 2025-02-28 | End: 2025-02-28

## 2025-02-28 RX ORDER — DOCUSATE SODIUM 100 MG/1
100 CAPSULE, LIQUID FILLED ORAL 2 TIMES DAILY
Qty: 10 CAPSULE | Refills: 0 | Status: SHIPPED | OUTPATIENT
Start: 2025-02-28 | End: 2025-03-01

## 2025-02-28 RX ORDER — OXYCODONE HYDROCHLORIDE 5 MG/1
5 TABLET ORAL EVERY 4 HOURS PRN
Status: DISCONTINUED | OUTPATIENT
Start: 2025-02-28 | End: 2025-03-01 | Stop reason: HOSPADM

## 2025-02-28 RX ORDER — BUPIVACAINE HYDROCHLORIDE 2.5 MG/ML
INJECTION, SOLUTION INFILTRATION; PERINEURAL AS NEEDED
Status: DISCONTINUED | OUTPATIENT
Start: 2025-02-28 | End: 2025-02-28 | Stop reason: HOSPADM

## 2025-02-28 RX ORDER — PHENYLEPHRINE HCL IN 0.9% NACL 1 MG/10 ML
SYRINGE (ML) INTRAVENOUS AS NEEDED
Status: DISCONTINUED | OUTPATIENT
Start: 2025-02-28 | End: 2025-02-28

## 2025-02-28 RX ORDER — OXYCODONE HYDROCHLORIDE 5 MG/1
5 TABLET ORAL EVERY 6 HOURS PRN
Qty: 15 TABLET | Refills: 0 | Status: SHIPPED | OUTPATIENT
Start: 2025-02-28 | End: 2025-03-01

## 2025-02-28 RX ORDER — SODIUM CHLORIDE, SODIUM LACTATE, POTASSIUM CHLORIDE, CALCIUM CHLORIDE 600; 310; 30; 20 MG/100ML; MG/100ML; MG/100ML; MG/100ML
100 INJECTION, SOLUTION INTRAVENOUS CONTINUOUS
Status: DISCONTINUED | OUTPATIENT
Start: 2025-02-28 | End: 2025-02-28 | Stop reason: HOSPADM

## 2025-02-28 RX ORDER — ONDANSETRON HYDROCHLORIDE 2 MG/ML
4 INJECTION, SOLUTION INTRAVENOUS EVERY 6 HOURS PRN
Status: DISCONTINUED | OUTPATIENT
Start: 2025-02-28 | End: 2025-03-01 | Stop reason: HOSPADM

## 2025-02-28 RX ORDER — CYCLOBENZAPRINE HCL 5 MG
5 TABLET ORAL EVERY 8 HOURS PRN
Qty: 30 TABLET | Refills: 0 | Status: SHIPPED | OUTPATIENT
Start: 2025-02-28

## 2025-02-28 RX ORDER — ONDANSETRON 4 MG/1
4 TABLET, FILM COATED ORAL EVERY 6 HOURS PRN
Qty: 9 TABLET | Refills: 0 | Status: SHIPPED | OUTPATIENT
Start: 2025-02-28 | End: 2025-03-07

## 2025-02-28 RX ORDER — DIPHENHYDRAMINE HYDROCHLORIDE 50 MG/ML
25 INJECTION INTRAMUSCULAR; INTRAVENOUS ONCE
Status: DISCONTINUED | OUTPATIENT
Start: 2025-02-28 | End: 2025-02-28 | Stop reason: HOSPADM

## 2025-02-28 RX ORDER — MIDAZOLAM HYDROCHLORIDE 1 MG/ML
INJECTION, SOLUTION INTRAMUSCULAR; INTRAVENOUS AS NEEDED
Status: DISCONTINUED | OUTPATIENT
Start: 2025-02-28 | End: 2025-02-28

## 2025-02-28 RX ORDER — SODIUM CHLORIDE, SODIUM LACTATE, POTASSIUM CHLORIDE, CALCIUM CHLORIDE 600; 310; 30; 20 MG/100ML; MG/100ML; MG/100ML; MG/100ML
100 INJECTION, SOLUTION INTRAVENOUS CONTINUOUS
Status: DISCONTINUED | OUTPATIENT
Start: 2025-02-28 | End: 2025-03-01 | Stop reason: HOSPADM

## 2025-02-28 RX ORDER — VANCOMYCIN HYDROCHLORIDE 1 G/20ML
INJECTION, POWDER, LYOPHILIZED, FOR SOLUTION INTRAVENOUS DAILY PRN
Status: DISCONTINUED | OUTPATIENT
Start: 2025-02-28 | End: 2025-03-01 | Stop reason: HOSPADM

## 2025-02-28 RX ORDER — LIDOCAINE HYDROCHLORIDE AND EPINEPHRINE 10; 10 UG/ML; MG/ML
INJECTION, SOLUTION INFILTRATION; PERINEURAL AS NEEDED
Status: DISCONTINUED | OUTPATIENT
Start: 2025-02-28 | End: 2025-02-28 | Stop reason: HOSPADM

## 2025-02-28 RX ORDER — NALOXONE HYDROCHLORIDE 0.4 MG/ML
0.1 INJECTION, SOLUTION INTRAMUSCULAR; INTRAVENOUS; SUBCUTANEOUS EVERY 5 MIN PRN
Status: DISCONTINUED | OUTPATIENT
Start: 2025-02-28 | End: 2025-03-01 | Stop reason: HOSPADM

## 2025-02-28 RX ORDER — DOCUSATE SODIUM 100 MG/1
100 CAPSULE, LIQUID FILLED ORAL 2 TIMES DAILY PRN
Status: DISCONTINUED | OUTPATIENT
Start: 2025-02-28 | End: 2025-03-01 | Stop reason: HOSPADM

## 2025-02-28 RX ORDER — HYDROMORPHONE HYDROCHLORIDE 1 MG/ML
1 INJECTION, SOLUTION INTRAMUSCULAR; INTRAVENOUS; SUBCUTANEOUS EVERY 5 MIN PRN
Status: DISCONTINUED | OUTPATIENT
Start: 2025-02-28 | End: 2025-02-28 | Stop reason: HOSPADM

## 2025-02-28 RX ORDER — ALBUTEROL SULFATE 0.83 MG/ML
2.5 SOLUTION RESPIRATORY (INHALATION)
Status: DISCONTINUED | OUTPATIENT
Start: 2025-02-28 | End: 2025-02-28 | Stop reason: HOSPADM

## 2025-02-28 RX ORDER — CEFAZOLIN SODIUM 2 G/100ML
INJECTION, SOLUTION INTRAVENOUS AS NEEDED
Status: DISCONTINUED | OUTPATIENT
Start: 2025-02-28 | End: 2025-02-28

## 2025-02-28 RX ORDER — ROCURONIUM BROMIDE 10 MG/ML
INJECTION, SOLUTION INTRAVENOUS AS NEEDED
Status: DISCONTINUED | OUTPATIENT
Start: 2025-02-28 | End: 2025-02-28

## 2025-02-28 RX ORDER — PROPOFOL 10 MG/ML
INJECTION, EMULSION INTRAVENOUS AS NEEDED
Status: DISCONTINUED | OUTPATIENT
Start: 2025-02-28 | End: 2025-02-28

## 2025-02-28 RX ORDER — HYDROMORPHONE HYDROCHLORIDE 1 MG/ML
INJECTION, SOLUTION INTRAMUSCULAR; INTRAVENOUS; SUBCUTANEOUS AS NEEDED
Status: DISCONTINUED | OUTPATIENT
Start: 2025-02-28 | End: 2025-02-28

## 2025-02-28 RX ORDER — ONDANSETRON 4 MG/1
4 TABLET, ORALLY DISINTEGRATING ORAL EVERY 6 HOURS PRN
Status: DISCONTINUED | OUTPATIENT
Start: 2025-02-28 | End: 2025-03-01 | Stop reason: HOSPADM

## 2025-02-28 RX ORDER — ACETAMINOPHEN 325 MG/1
650 TABLET ORAL EVERY 6 HOURS
Status: DISCONTINUED | OUTPATIENT
Start: 2025-02-28 | End: 2025-03-01 | Stop reason: HOSPADM

## 2025-02-28 RX ORDER — DIPHENHYDRAMINE HYDROCHLORIDE 50 MG/ML
12.5 INJECTION INTRAMUSCULAR; INTRAVENOUS ONCE AS NEEDED
Status: COMPLETED | OUTPATIENT
Start: 2025-02-28 | End: 2025-02-28

## 2025-02-28 RX ORDER — ONDANSETRON HYDROCHLORIDE 2 MG/ML
4 INJECTION, SOLUTION INTRAVENOUS ONCE
Status: COMPLETED | OUTPATIENT
Start: 2025-02-28 | End: 2025-02-28

## 2025-02-28 RX ORDER — SODIUM CHLORIDE, SODIUM LACTATE, POTASSIUM CHLORIDE, CALCIUM CHLORIDE 600; 310; 30; 20 MG/100ML; MG/100ML; MG/100ML; MG/100ML
INJECTION, SOLUTION INTRAVENOUS CONTINUOUS PRN
Status: DISCONTINUED | OUTPATIENT
Start: 2025-02-28 | End: 2025-02-28

## 2025-02-28 RX ORDER — FENTANYL CITRATE 50 UG/ML
INJECTION, SOLUTION INTRAMUSCULAR; INTRAVENOUS AS NEEDED
Status: DISCONTINUED | OUTPATIENT
Start: 2025-02-28 | End: 2025-02-28

## 2025-02-28 RX ORDER — LIDOCAINE HYDROCHLORIDE 20 MG/ML
INJECTION, SOLUTION EPIDURAL; INFILTRATION; INTRACAUDAL; PERINEURAL AS NEEDED
Status: DISCONTINUED | OUTPATIENT
Start: 2025-02-28 | End: 2025-02-28

## 2025-02-28 RX ORDER — HEPARIN SODIUM 5000 [USP'U]/ML
5000 INJECTION, SOLUTION INTRAVENOUS; SUBCUTANEOUS EVERY 8 HOURS
Status: DISCONTINUED | OUTPATIENT
Start: 2025-02-28 | End: 2025-03-01 | Stop reason: HOSPADM

## 2025-02-28 RX ORDER — LABETALOL HYDROCHLORIDE 5 MG/ML
5 INJECTION, SOLUTION INTRAVENOUS
Status: DISCONTINUED | OUTPATIENT
Start: 2025-02-28 | End: 2025-02-28 | Stop reason: HOSPADM

## 2025-02-28 RX ADMIN — Medication 100 MCG: at 11:14

## 2025-02-28 RX ADMIN — MIDAZOLAM 2 MG: 1 INJECTION INTRAMUSCULAR; INTRAVENOUS at 07:43

## 2025-02-28 RX ADMIN — Medication 200 MCG: at 10:43

## 2025-02-28 RX ADMIN — Medication 200 MCG: at 09:39

## 2025-02-28 RX ADMIN — Medication 100 MCG: at 10:10

## 2025-02-28 RX ADMIN — ACETAMINOPHEN 325MG 650 MG: 325 TABLET ORAL at 19:44

## 2025-02-28 RX ADMIN — HYDROMORPHONE HYDROCHLORIDE 0.5 MG: 1 INJECTION, SOLUTION INTRAMUSCULAR; INTRAVENOUS; SUBCUTANEOUS at 11:16

## 2025-02-28 RX ADMIN — Medication 7 L/MIN: at 11:10

## 2025-02-28 RX ADMIN — Medication 200 MCG: at 09:29

## 2025-02-28 RX ADMIN — OXYCODONE HYDROCHLORIDE 5 MG: 5 TABLET ORAL at 19:44

## 2025-02-28 RX ADMIN — Medication 200 MCG: at 09:23

## 2025-02-28 RX ADMIN — FENTANYL CITRATE 50 MCG: 50 INJECTION, SOLUTION INTRAMUSCULAR; INTRAVENOUS at 07:52

## 2025-02-28 RX ADMIN — SODIUM CHLORIDE, POTASSIUM CHLORIDE, SODIUM LACTATE AND CALCIUM CHLORIDE: 600; 310; 30; 20 INJECTION, SOLUTION INTRAVENOUS at 10:12

## 2025-02-28 RX ADMIN — FENTANYL CITRATE 50 MCG: 50 INJECTION, SOLUTION INTRAMUSCULAR; INTRAVENOUS at 07:54

## 2025-02-28 RX ADMIN — Medication 100 MCG: at 09:49

## 2025-02-28 RX ADMIN — HYDROMORPHONE HYDROCHLORIDE 0.5 MG: 1 INJECTION, SOLUTION INTRAMUSCULAR; INTRAVENOUS; SUBCUTANEOUS at 10:52

## 2025-02-28 RX ADMIN — Medication 200 MCG: at 10:49

## 2025-02-28 RX ADMIN — Medication 2 L/MIN: at 12:00

## 2025-02-28 RX ADMIN — DIPHENHYDRAMINE HYDROCHLORIDE 12.5 MG: 50 INJECTION, SOLUTION INTRAMUSCULAR; INTRAVENOUS at 12:29

## 2025-02-28 RX ADMIN — ROCURONIUM BROMIDE 20 MG: 10 INJECTION INTRAVENOUS at 10:25

## 2025-02-28 RX ADMIN — ROCURONIUM BROMIDE 20 MG: 10 INJECTION INTRAVENOUS at 08:35

## 2025-02-28 RX ADMIN — ROCURONIUM BROMIDE 20 MG: 10 INJECTION INTRAVENOUS at 09:43

## 2025-02-28 RX ADMIN — ONDANSETRON 4 MG: 2 INJECTION INTRAMUSCULAR; INTRAVENOUS at 12:24

## 2025-02-28 RX ADMIN — PROPOFOL 50 MG: 10 INJECTION, EMULSION INTRAVENOUS at 07:55

## 2025-02-28 RX ADMIN — LIDOCAINE HYDROCHLORIDE 60 MG: 20 INJECTION, SOLUTION EPIDURAL; INFILTRATION; INTRACAUDAL; PERINEURAL at 07:52

## 2025-02-28 RX ADMIN — PROPOFOL 150 MG: 10 INJECTION, EMULSION INTRAVENOUS at 07:52

## 2025-02-28 RX ADMIN — HYDROMORPHONE HYDROCHLORIDE 0.5 MG: 1 INJECTION, SOLUTION INTRAMUSCULAR; INTRAVENOUS; SUBCUTANEOUS at 12:00

## 2025-02-28 RX ADMIN — SODIUM CHLORIDE, POTASSIUM CHLORIDE, SODIUM LACTATE AND CALCIUM CHLORIDE: 600; 310; 30; 20 INJECTION, SOLUTION INTRAVENOUS at 07:42

## 2025-02-28 RX ADMIN — METOCLOPRAMIDE 10 MG: 5 INJECTION, SOLUTION INTRAMUSCULAR; INTRAVENOUS at 11:20

## 2025-02-28 RX ADMIN — Medication 100 MCG: at 10:00

## 2025-02-28 RX ADMIN — VANCOMYCIN HYDROCHLORIDE 750 MG: 750 INJECTION, POWDER, LYOPHILIZED, FOR SOLUTION INTRAVENOUS at 16:59

## 2025-02-28 RX ADMIN — ROCURONIUM BROMIDE 20 MG: 10 INJECTION INTRAVENOUS at 09:11

## 2025-02-28 RX ADMIN — ROCURONIUM BROMIDE 50 MG: 10 INJECTION INTRAVENOUS at 07:53

## 2025-02-28 RX ADMIN — MAGNESIUM SULFATE HEPTAHYDRATE 2 G: 500 INJECTION, SOLUTION INTRAMUSCULAR; INTRAVENOUS at 08:00

## 2025-02-28 RX ADMIN — HYDROMORPHONE HYDROCHLORIDE 0.5 MG: 1 INJECTION, SOLUTION INTRAMUSCULAR; INTRAVENOUS; SUBCUTANEOUS at 11:37

## 2025-02-28 RX ADMIN — HEPARIN SODIUM 5000 UNITS: 5000 INJECTION, SOLUTION INTRAVENOUS; SUBCUTANEOUS at 21:38

## 2025-02-28 RX ADMIN — SODIUM CHLORIDE, POTASSIUM CHLORIDE, SODIUM LACTATE AND CALCIUM CHLORIDE 100 ML/HR: 600; 310; 30; 20 INJECTION, SOLUTION INTRAVENOUS at 14:00

## 2025-02-28 RX ADMIN — FENTANYL CITRATE 50 MCG: 50 INJECTION, SOLUTION INTRAMUSCULAR; INTRAVENOUS at 08:20

## 2025-02-28 RX ADMIN — Medication 100 MCG: at 09:01

## 2025-02-28 RX ADMIN — DEXAMETHASONE SODIUM PHOSPHATE 10 MG: 10 INJECTION INTRAMUSCULAR; INTRAVENOUS at 07:56

## 2025-02-28 RX ADMIN — CEFAZOLIN SODIUM 2 G: 2 INJECTION, SOLUTION INTRAVENOUS at 08:01

## 2025-02-28 RX ADMIN — PROPOFOL 20 MCG/KG/MIN: 10 INJECTION, EMULSION INTRAVENOUS at 08:00

## 2025-02-28 RX ADMIN — Medication 120 MCG: at 08:43

## 2025-02-28 RX ADMIN — Medication 100 MCG: at 08:59

## 2025-02-28 RX ADMIN — ONDANSETRON 4 MG: 2 INJECTION, SOLUTION INTRAMUSCULAR; INTRAVENOUS at 10:52

## 2025-02-28 RX ADMIN — OXYCODONE HYDROCHLORIDE 5 MG: 5 TABLET ORAL at 14:52

## 2025-02-28 SDOH — SOCIAL STABILITY: SOCIAL INSECURITY: WITHIN THE LAST YEAR, HAVE YOU BEEN AFRAID OF YOUR PARTNER OR EX-PARTNER?: NO

## 2025-02-28 SDOH — ECONOMIC STABILITY: FOOD INSECURITY: WITHIN THE PAST 12 MONTHS, THE FOOD YOU BOUGHT JUST DIDN'T LAST AND YOU DIDN'T HAVE MONEY TO GET MORE.: NEVER TRUE

## 2025-02-28 SDOH — HEALTH STABILITY: MENTAL HEALTH: CURRENT SMOKER: 0

## 2025-02-28 SDOH — SOCIAL STABILITY: SOCIAL INSECURITY: HAVE YOU HAD ANY THOUGHTS OF HARMING ANYONE ELSE?: NO

## 2025-02-28 SDOH — SOCIAL STABILITY: SOCIAL INSECURITY: DO YOU FEEL UNSAFE GOING BACK TO THE PLACE WHERE YOU ARE LIVING?: NO

## 2025-02-28 SDOH — ECONOMIC STABILITY: FOOD INSECURITY: WITHIN THE PAST 12 MONTHS, YOU WORRIED THAT YOUR FOOD WOULD RUN OUT BEFORE YOU GOT THE MONEY TO BUY MORE.: NEVER TRUE

## 2025-02-28 SDOH — SOCIAL STABILITY: SOCIAL INSECURITY: HAVE YOU HAD THOUGHTS OF HARMING ANYONE ELSE?: NO

## 2025-02-28 SDOH — ECONOMIC STABILITY: INCOME INSECURITY: IN THE PAST 12 MONTHS HAS THE ELECTRIC, GAS, OIL, OR WATER COMPANY THREATENED TO SHUT OFF SERVICES IN YOUR HOME?: NO

## 2025-02-28 SDOH — SOCIAL STABILITY: SOCIAL INSECURITY: DO YOU FEEL ANYONE HAS EXPLOITED OR TAKEN ADVANTAGE OF YOU FINANCIALLY OR OF YOUR PERSONAL PROPERTY?: NO

## 2025-02-28 SDOH — SOCIAL STABILITY: SOCIAL INSECURITY
WITHIN THE LAST YEAR, HAVE YOU BEEN KICKED, HIT, SLAPPED, OR OTHERWISE PHYSICALLY HURT BY YOUR PARTNER OR EX-PARTNER?: NO

## 2025-02-28 SDOH — SOCIAL STABILITY: SOCIAL INSECURITY: WERE YOU ABLE TO COMPLETE ALL THE BEHAVIORAL HEALTH SCREENINGS?: YES

## 2025-02-28 SDOH — SOCIAL STABILITY: SOCIAL INSECURITY: WITHIN THE LAST YEAR, HAVE YOU BEEN HUMILIATED OR EMOTIONALLY ABUSED IN OTHER WAYS BY YOUR PARTNER OR EX-PARTNER?: NO

## 2025-02-28 SDOH — SOCIAL STABILITY: SOCIAL INSECURITY: ARE YOU OR HAVE YOU BEEN THREATENED OR ABUSED PHYSICALLY, EMOTIONALLY, OR SEXUALLY BY ANYONE?: NO

## 2025-02-28 SDOH — SOCIAL STABILITY: SOCIAL INSECURITY: ARE THERE ANY APPARENT SIGNS OF INJURIES/BEHAVIORS THAT COULD BE RELATED TO ABUSE/NEGLECT?: NO

## 2025-02-28 SDOH — SOCIAL STABILITY: SOCIAL INSECURITY
WITHIN THE LAST YEAR, HAVE YOU BEEN RAPED OR FORCED TO HAVE ANY KIND OF SEXUAL ACTIVITY BY YOUR PARTNER OR EX-PARTNER?: NO

## 2025-02-28 SDOH — SOCIAL STABILITY: SOCIAL INSECURITY: ABUSE: ADULT

## 2025-02-28 SDOH — SOCIAL STABILITY: SOCIAL INSECURITY: DOES ANYONE TRY TO KEEP YOU FROM HAVING/CONTACTING OTHER FRIENDS OR DOING THINGS OUTSIDE YOUR HOME?: NO

## 2025-02-28 SDOH — SOCIAL STABILITY: SOCIAL INSECURITY: HAS ANYONE EVER THREATENED TO HURT YOUR FAMILY OR YOUR PETS?: NO

## 2025-02-28 ASSESSMENT — ACTIVITIES OF DAILY LIVING (ADL)
TOILETING: INDEPENDENT
LACK_OF_TRANSPORTATION: NO
BATHING: INDEPENDENT
PATIENT'S MEMORY ADEQUATE TO SAFELY COMPLETE DAILY ACTIVITIES?: YES
HEARING - RIGHT EAR: FUNCTIONAL
ADEQUATE_TO_COMPLETE_ADL: YES
JUDGMENT_ADEQUATE_SAFELY_COMPLETE_DAILY_ACTIVITIES: YES
HEARING - LEFT EAR: FUNCTIONAL
FEEDING YOURSELF: INDEPENDENT
DRESSING YOURSELF: INDEPENDENT
WALKS IN HOME: INDEPENDENT
GROOMING: INDEPENDENT

## 2025-02-28 ASSESSMENT — COLUMBIA-SUICIDE SEVERITY RATING SCALE - C-SSRS
6. HAVE YOU EVER DONE ANYTHING, STARTED TO DO ANYTHING, OR PREPARED TO DO ANYTHING TO END YOUR LIFE?: NO
1. IN THE PAST MONTH, HAVE YOU WISHED YOU WERE DEAD OR WISHED YOU COULD GO TO SLEEP AND NOT WAKE UP?: NO
6. HAVE YOU EVER DONE ANYTHING, STARTED TO DO ANYTHING, OR PREPARED TO DO ANYTHING TO END YOUR LIFE?: NO
2. HAVE YOU ACTUALLY HAD ANY THOUGHTS OF KILLING YOURSELF?: NO
2. HAVE YOU ACTUALLY HAD ANY THOUGHTS OF KILLING YOURSELF?: NO
1. IN THE PAST MONTH, HAVE YOU WISHED YOU WERE DEAD OR WISHED YOU COULD GO TO SLEEP AND NOT WAKE UP?: NO

## 2025-02-28 ASSESSMENT — PAIN - FUNCTIONAL ASSESSMENT

## 2025-02-28 ASSESSMENT — PAIN DESCRIPTION - ORIENTATION
ORIENTATION: RIGHT;LEFT

## 2025-02-28 ASSESSMENT — PAIN DESCRIPTION - LOCATION
LOCATION: BREAST

## 2025-02-28 ASSESSMENT — PAIN SCALES - GENERAL
PAINLEVEL_OUTOF10: 2
PAINLEVEL_OUTOF10: 5 - MODERATE PAIN
PAINLEVEL_OUTOF10: 3
PAINLEVEL_OUTOF10: 2
PAINLEVEL_OUTOF10: 1
PAINLEVEL_OUTOF10: 0 - NO PAIN
PAINLEVEL_OUTOF10: 2
PAINLEVEL_OUTOF10: 5 - MODERATE PAIN
PAINLEVEL_OUTOF10: 6
PAINLEVEL_OUTOF10: 6
PAINLEVEL_OUTOF10: 0 - NO PAIN
PAINLEVEL_OUTOF10: 0 - NO PAIN
PAIN_LEVEL: 0
PAINLEVEL_OUTOF10: 3
PAINLEVEL_OUTOF10: 4

## 2025-02-28 ASSESSMENT — COGNITIVE AND FUNCTIONAL STATUS - GENERAL
DAILY ACTIVITIY SCORE: 24
MOBILITY SCORE: 24
PATIENT BASELINE BEDBOUND: NO

## 2025-02-28 ASSESSMENT — PATIENT HEALTH QUESTIONNAIRE - PHQ9
2. FEELING DOWN, DEPRESSED OR HOPELESS: NOT AT ALL
1. LITTLE INTEREST OR PLEASURE IN DOING THINGS: NOT AT ALL
SUM OF ALL RESPONSES TO PHQ9 QUESTIONS 1 & 2: 0

## 2025-02-28 ASSESSMENT — LIFESTYLE VARIABLES
HOW OFTEN DO YOU HAVE A DRINK CONTAINING ALCOHOL: NEVER
HOW MANY STANDARD DRINKS CONTAINING ALCOHOL DO YOU HAVE ON A TYPICAL DAY: PATIENT DOES NOT DRINK
AUDIT-C TOTAL SCORE: 0
AUDIT-C TOTAL SCORE: 0
HOW OFTEN DO YOU HAVE 6 OR MORE DRINKS ON ONE OCCASION: NEVER
SKIP TO QUESTIONS 9-10: 1

## 2025-02-28 NOTE — OP NOTE
Plastic Surgery Operative Note       Date: 2025  OR Location: [facility] OR    Name: Sarah Welch, : 1980, Age: 44 y.o., MRN: [id number], Sex: female    Diagnosis  Pre-op Diagnosis      * Lobular carcinoma in situ (LCIS) of right breast [D05.01]     * At increased risk of breast cancer [Z91.89] Post-op Diagnosis     * Lobular carcinoma in situ (LCIS) of right breast [D05.01]     * At increased risk of breast cancer [Z91.89]     Procedures    Bilateral FRANCISCO - Dr Prajapati  Bilateral Direct to Implant Breast reconstruction with structured saline implants, 350cc  Bilateral 14 x 2 cm abdominal adjacent tissue transfer to reconstruct obliterated inframammary fold  Injection of multiple intercostal nerves bilaterally for postoperative analgesia not intraoperative pain control  Use of mesh soft tissue support  Intraoperative ICG injection and spy flap perfusion assessment with spy    Surgeons   Panel 1:     * Beth Prajapati - Primary  Panel 2:     * Rex Jimenez - Primary    Resident/Fellow/Other Assistant:  Monica Bella PA-C.  Given the inherent complexity of this surgery, a second surgeon or a surgically skilled physician assistant was necessary for the successful completion of this entire operative procedure. Monica Bella served as the surgical assistant and was present for the entire operative procedure and participated in all aspects of patient care. This included transporting the patient to the operating room and entering room with the patient, assisting with preoperative positioning, first assisting throughout the entire surgical procedure by functioning as a second assistant surgeon, assisting with surgical closure, and finally accompanying the patient to recovery.      Staff:   Surgical Assistant:   Surgical Assistant:   Lm: Amara  Circulator: Khadra Mitchell Person: Lacy Escobarub Person: Yohannes Escobarub Person: Nelsy Mitchell Person: Nancy    Anesthesia Staff: Anesthesiologist: Cely Shea  MD  CRNA: AINSLEY Portillo-CRNA  SRNA: Trey Sandie    Procedure Summary  Anesthesia: General  ASA: I  Estimated Blood Loss: 20mL  Intra-op Medications:   Administrations occurring from 0730 to 1415 on 02/28/25:   Medication Name Total Dose   ceFAZolin (Ancef) IV 2 g in 100 mL dextrose (iso) - premix 2 g   dexAMETHasone (Decadron) PF injection 10 mg/mL 10 mg   fentaNYL (Sublimaze) injection 50 mcg/mL 150 mcg   LR infusion Cannot be calculated   lidocaine PF (Xylocaine-MPF) local injection 2 % 60 mg   magnesium sulfate 50 % injection 2 g   midazolam (Versed) injection 1 mg/mL 2 mg   phenylephrine 40 mcg/mL syringe 10 mL 320 mcg   propofol (Diprivan) injection 10 mg/mL 629 mg   rocuronium (ZeMuron) 50 mg/5 mL injection 90 mg              Anesthesia Record               Intraprocedure I/O Totals          Intake    ceFAZolin 2 gram/100 mL 100.00 mL    Total Intake 100 mL          Specimen:   ID Type Source Tests Collected by Time   1 : RIGHT NIPPLE SPARING PROPHYLACTIC MASTECTOMY-SHORT STITCH SUPERIOR, LONG STITCH LATERAL Tissue BREAST MASTECTOMY PROPHYLACTIC  RIGHT SURGICAL PATHOLOGY EXAM Beth Prajapati, DO 2/28/2025 0825   2 : LEFT NIPPLE SPARING PROPHYLACTIC MASTECTOMY-SHORT STITCH SUPERIOR, LONG STITCH LATERAL Tissue BREAST MASTECTOMY PROPHYLACTIC  LEFT SURGICAL PATHOLOGY EXAM Beth Prajapati, DO 2/28/2025 0825   3 : LEFT NIPPLE CORE Tissue BREAST, NIPPLE BIOPSY LEFT SURGICAL PATHOLOGY EXAM Beth Prajapati, DO 2/28/2025 0828   4 : RIGHT NIPPLE CORE Tissue BREAST, NIPPLE BIOPSY RIGHT SURGICAL PATHOLOGY EXAM Beth Prajapati, DO 2/28/2025 0828                 Drains and/or Catheters: * None in log *    Tourniquet Times:         Implants:  Implants       Type Name Action Serial No.      Breast Implant FILL KIT, UNIVERSAL, IMPLANT, MAMMARY - [id number] Used, Not Implanted               Findings: Excellent perfusion of bilateral breast mastectomy flaps on spy, distal tip of NAC was noted to be moderately  dusky  Right side specimen weight = 265 g  Left side specimen weight = 286 g      Indications: Sarah Welch is an 44 y.o. female who is having surgery for Lobular carcinoma in situ (LCIS) of right breast [D05.01]  At increased risk of breast cancer [Z91.89]. She presents for breast reconstruction in the setting of increased lifetime risk for breast cancer, related to history of LCIS, family history and extremely breast dense breasts        We discussed options for Direct to Implant (DTI) reconstruction.  We discussed that indications for this include patients who are healthy, non-smoking patients with small- to moderate-sized breasts who desire to be of similar breast size following reconstruction.  We mentioned that patients who wish to attain a significantly larger breast size are better off with a 2-stage tissue-espander-implant reconstruction.  We stressed that patients undergoing DTI reconstruction should harbor realistic expectations about their range of postoperative breast size and she understands that breast enlargement is not possible.  We discussed that DTI should not be viewed as a single stage reconstruction, but that interval second stage fat grafting should be considered as part of the reconstructive plan.    Patients with prior breast radiation or preexisting scars that adversely affect mastectomy skin flap perfusion are not considered good candidates for DTI. Similarly, patients with morbid obesity, uncontrolled diabetes mellitus, poorly perfused or thin mastectomy skin flaps, or advanced oncologic disease would best be offered a two-stage approach instead.   Additionally, we discussed mastectomy incision type. When oncologic safety allows, nipple sparing mastectomy is preferred.  The first choice and most favorable NSM incision is the lateral inframammary fold (IMF), as it permits good visualization for the mastectomy and is the least noticeable on frontal view. While large breasts pose a  challenge for the oncologic surgeon, this incision has been associated with the lowest rate of nipple necrosis in the literature. We discussed that second and third choices are typically the vertical areolar to IMF incision and the inverted-T incision. Due to challenges in accessing the axilla through the vertical incision, a second incision is sometimes required. The lateral radial incision is the least favored of the incisions. It is directly visible on the breast and can cause retraction ischemia in the mastectomy skin flaps.  We discussed that should there be any doubt regarding the intraoperative viability of the skin flap, the procedure would be modified and an expander placed in the prepectoral pocket, or the reconstruction would be deferred. If tissue expander is placed, the expander can then be injected with a volume sufficient to gently fill the skin envelope (hand-in-glove fit) without causing skin tension.  We would then complete filling of the expander once the skin has healed.  If no expander is placed and reconstruction is abandoned, we would plan to return to OR to perform delayed tissue-expander reconstruction.  We discussed that post-operative drains would be required to drain all dead-space fluid to prevent seroma and limit stress on the skin envelope in the post-operative period.  Drains are removed when output is less than 30cc for 48 hours.      I discussed with her common implant complications as rippling, animation deformity, rupture, and capsular contracture. Furthermore, I discussed the current cases of anaplastic T cell lymphoma as examples of risks of form-stable and textured implant based reconstruction.      I discussed with the patient that this is reconstructive surgery, and there are no guarantees of results.  Sometimes, patients are dissatisfied and sometimes patients require revision surgery.  We try our best to achieve several goals in surgery, our goals of the surgery are #1  removal of cancer/cancer risk, #2 decrease risk of complication to 0 if possible, #3 are cosmetic/aesthetic result.  We discussed that we plan second stage surgeries, and that radiation will play a role in her overall outcome if it is indicated.       I gave her supplemental reading information regarding our discussion. I had her repeat to me in her own words what we had discussed, and she was able to do so satisfactorily.     After reviewing our discussion, it is my impression that -she is interested in direct to implant breast reconstruction with structured saline implant, as well as targeted nipple reinnervation        Plan:   Bilateral nipple sparing mastectomy--we discussed today that her nipples are protuberant of the skin approximately 2.5 to 2.75 cm, this does increase her risk slightly for partial necrosis of the nipple.  She understands this.  Direct to implant reconstruction with saline implant  Targeted nipple reinnervation was denied        I discussed with the patient that this is reconstructive surgery, and there are no guarantees of results.  Sometimes, patients are dissatisfied and sometimes patients require revision surgery.  If there is a need to return to surgery to correct any problems or complications, we indicated that in some instances, the return to the operating room may not covered by insurance.  I indicated that I do not charge the patient for return to the operating room, but there WILL BE charges for operating room/facility fees and anesthesia fees, over which we have no control.       Patient is indicated for above-stated procedure                 INFORMED CONSENT  Patient was told the risks, benefits, INDICATIONS, contraindications, and alternatives to the procedure. Risks include but not limited to pain, infection, bleeding, hematoma, seroma, injury to neurovascular and tendinous structures, imperfect cosmesis, mastectomy flap necrosis, delayed wound healing, imperfect cosmesis,  asymmetry, cosmetic dissatisfaction, implant failure, infection risk, we discussed that mastectomy flap necrosis risk, status and need for subsequent surgeries.     The patient demonstrated understanding of these risks and agreed to proceed with surgery.  Advance directives discussed.  Team approach explained.      The patient consented and wished to proceed with the procedure and for medical photography if needed.     PROCEDURAL PAUSE  Prior to the beginning of the procedure, the patient's correct identity, side, site, and procedure to be performed were verified.  The patient was given intravenous antibiotics prior to skin incision.     PROCEDURAL NOTE    Plastic surgery entered the operative theater after our breast colleagues had completed their portion of the mastectomy.  Please see notes for full operative details.    We inspected the mastectomy flaps and concluded that the flaps are well vascularized, and we would be able to proceed with immediate reconstruction.    We began by performing meticulous hemostasis bilaterally using unipolar bipolar cautery.  We then irrigated thoroughly with irrisept solution and normal saline, total of 3 L.    We began on the right side.  We noted that the IMF had been obliterated during mastectomy and so we began surgical reconstruction by performing advancement of the abdominal tissue, Franklyn flap, for reconstruction of the inframammary fold, total of 14x2 cm.  We advanced this and used 0 PDS suture to secure the Franklyn flap and new IMF to the periosteum of the ribs at the IMF which was marked preoperatively.        Right side mastectomy specimen weight = 265 g  We then measured the breast with and noted to be 13 cm, we obtained a saline sizer and placed this into the prepectoral space, we then performed provisional closure of the skin, and began expansion on table.  At the same time we injected ICG dye and used the spy fluoroscopy imaging machine in order to evaluate perfusion of  the mastectomy flap while performing expansion of the sizer.  We reached a size of 350 cc, and noted this to provide adequate reconstruction well maintaining excellent perfusion to the mastectomy flaps.  We thus chose this size for reconstruction.    We then obtained a 20x30 centimeter piece of TIGR mesh and performed anterior and posterior wrapping of the IDEAL implant which was filled to 350 cc, while being bathed in a Irrisept solution, alternating with Betadine. The TIGR mesh pocket was sewn together with 2-0 Vicryl suture.  We then brought the mesh/implant construct onto the prepectoral space, and tacked the mesh/implant to the pectoralis major muscle, taking care to focus the positioning medially and inferiorly.  We tacked the mesh to the pectoralis major muscle fascia with 2-0 PDS suture circumferentially in an interrupted fashion.    We then placed two 15 Citizen of [place] drains exiting posterior to the anterior axillary line, the first drain drained the IMF, the second drain drained the lateral axilla and the superior pole, these were secured with 0 silk suture.    20 cc of 0.5% marcaine with epinephrine was injected into multiple intercostal nerves through rib periosteum for post operative pain control and not intraoperative analgesia.    After this was completed we irrigated with 1 L of irrisept solution, draped the mastectomy flaps over the reconstruction and performed closure with 2-0 PDS in the fascia, where available, and 3-0 strata fix suture in the deep dermis, which returned back and ran in the subcuticular layer.    We then turned our attention to the contralateral side, left mastectomy specimen weight = 286 g  We then measured the breast with and noted to be 13 cm.  We placed the sizer from the contralateral side with a size of 350 cc, and noted this to provide adequate reconstruction well maintaining excellent perfusion to the mastectomy flaps.  We thus chose this size for reconstruction.    We then  obtained a 20x30 cm piece of TIGR mesh and performed anterior and posterior wrapping of the IDEAL implant which was filled to 350 cc, while being bathed in a Irrisept solution, alternating with Betadine. The TIGR mesh pocket was sewn together with 2-0 Vicryl suture.  We then brought the mesh/implant construct onto the prepectoral space, and tacked the mesh/implant to the pectoralis major muscle, taking care to focus the positioning medially and inferiorly.  We tacked the mesh to the pectoralis major muscle fascia with 2-0 PDS suture circumferentially in an interrupted fashion.    We then placed two 15 [place] drains exiting posterior to the anterior axillary line, the first drain drained the IMF, the second drain drained the lateral axilla and the superior pole, these were secured with 0 silk suture.    20 cc of 0.5% marcaine with epinephrine was injected into multiple intercostal nerves through rib periosteum for post operative pain control and not intraoperative analgesia.    After this was completed we irrigated with 1 L of irrisept solution, draped the mastectomy flaps over the reconstruction and performed closure with 2-0 PDS in the fascia, where available, and 3-0 strata fix suture in the deep dermis, which returned back and ran in the subcuticular layer.    At the conclusion of each side, we injected 120cc of Irriscept solution through the drains and allowed this to dwell while we continued working.  The drains were then set to bulb suction before leaving the operating room.    Final dressings consisted of Exofin glue and mesh over incisions, generous Nitro-Bid paste was placed over the mastectomy flaps and covered with Tegaderms, ABDs over the breast, fluffs in the axilla, and surgical bra.    The patient tolerated the procedure well and was awakened from anesthesia without any difficulties, was transferred to the patient in stable condition, all needle counts were correct.    POST OP PLAN:  Sarah Welch    will  be discharged when stable.  Discharge medications will include: narcotic pain control, antibiotics, and flexeril.  She is encouraged to shower daily.  She is instructed to place Nitro-Bid paste generously over the mastectomy flaps daily    We will plan to have postop visit in 1 week to remove the IMF drain, which will be followed by a postop visit and week 2 to remove the superior pole drain.

## 2025-02-28 NOTE — DISCHARGE INSTRUCTIONS
Plastic Surgery Breast Reconstruction                Post Operative Instructions    Office Phone: [phone] or contact central scheduling  After-Hours Patient line: [phone]  (Use this number for medical questions/concerns only after 4pm or on the weekends)      Activity:   -avoid activities that use your chest muscles such as pushing and pulling for 3 weeks after surgery.   -no lifting greater than 10lbs for 2 weeks after surgery  -ok for walking and ambulating    Wound/Dressing Care:   -You will have a surgical bra, wear this daily. Ok to remove to shower and then replace.   -You will have surgical drains. Strip, empty, and record output 2-3 times daily. Bring record log of drain output with you to your follow up appointment in 1 week   -your incisions will have purple surgical glue, this is ok to get wet. You may shower beginning the day after surgery. We recommend warm/hot showers 1-2 times daily. Ok for soap and water.     Pain:   Pain and discomfort is expected after any surgery, but most patients do well with Tylenol (acetaminophen) 650 mg every 6 hours in addition to Motrin or Advil (ibuprofen) 600 mg every 6 hours. These can be staggered so you're alternating the medications every 3 hours. Do not take ibuprofen if you have kidney disease or have previously been told to avoid NSAIDs. You may also have a prescription for a stronger opioid-based pain medication at discharge, which is only for breakthrough pain. You should take Colace 100 mg capsules (available at any local drug store) twice a day while taking any opioid pain medication. Constipation can be alleviated with over-the-counter laxatives (such as Milk of Magnesia) until the problem has resolved. If you take any anticoagulation or antiplatelet medication, this medication can be resumed 48 hours after surgery unless you were informed otherwise.     Nitrobid ointment:      -Upon showering, remove the clear dressing that is covering your  breast and nipples. Shower with warm/hot water and pat dry. Apply a layer of the nitrobid ointment to the nipples and surrounding breast skin and cover with clear tegaderm or gauze. Use gloves or Qtip when applying nitrobid ointment. Lightheadedness and headache may occur with use. If symptoms are not tolerable please contact Dr. Jimenez office at [phone]. If you require medical assistance after 4pm or on the weekends please call the after hour patient line at [phone]

## 2025-02-28 NOTE — OP NOTE
Date: 2025  OR Location: [facility] OR    Name: Sarah Welch, : 1980, Age: 44 y.o., MRN: [id number], Sex: female    Diagnosis  Pre-op Diagnosis      * Lobular carcinoma in situ (LCIS) of right breast [D05.01]     * At increased risk of breast cancer [Z91.89] Post-op Diagnosis     * Lobular carcinoma in situ (LCIS) of right breast [D05.01]     * At increased risk of breast cancer [Z91.89]     Procedures  BILATERAL NIPPLE SPARING MASTECTOMY  86716 - UT MASTECTOMY SIMPLE COMPLETE    Surgeons   Panel 1:     * Beth Prajapati - Primary  Panel 2:     * Rex Jimenez - Primary    Resident/Fellow/Other Assistant:  Surgeons and Role:  Panel 2:     * Monica Garduno PA-C - Assisting    Procedure Summary  Anesthesia: General  ASA: I  Anesthesia Staff: Anesthesiologist: Cely Shea MD  CRNA: AINSLEY Portillo-CRNA  SRNA: Trey Hooper  Estimated Blood Loss: 75mL    Staff:   Circulator: Amara Noble RN; Khadra Thomas RN  Scrub Person: Lacy Iglesias; Yohannes Seay; Nancy Lund; Nelsy Kirkpatrick    Details of Procedure:    The patient was identified by name and birth date in pre-op and the surgical site was marked with the plastic surgeon. The patient was transferred to the operative suite and placed supine on the OR table.  A sign-in was performed verifying patient identity, procedure and laterality.  One dose of pre-operative antibiotics was given.  the bilateral breast and axilla were prepped and draped in the usual sterile fashion.  Just prior to incision, a time-out was performed confirming patient identity, procedure and laterality.  Attention was first turned to the right breast. A 15-blade scalpel was used to make an inframammary incision. Flaps were raised superiorly to the NAC.  Cold, sharp dissection was used to dissect the ductal tissue extending up to the nipple.  Dissection proceeded superior to clavicle. The posterior dissection was then performed dissecting the pectoralis  fascia and breast from the underlying pectoralis muscle. Dissection proceeded laterally to the latissimus dorsi and medially to the sternal border.  The axillary tail was then divided under direct visualization. The breast was marked with a silk suture- short superior, long lateral and the specimen was passed off the field to pathology. A separate nipple core was taken as a separate specimen. Next, attention was turned to the left breast.  In a similar fashion, a 15-blade scalpel was used to make an inframammary incision. Flaps were raised superiorly to the NAC.  Cold, sharp dissection was used to dissect the ductal tissue extending up to the nipple.  Dissection proceeded superior to clavicle. The posterior dissection was then performed dissecting the pectoralis fascia and breast from the underlying pectoralis muscle. Dissection proceeded laterally to the latissimus dorsi and medially to the sternal border.  The axillary tail was then divided under direct visualization. The breast was marked with a silk suture- short superior, long lateral and the specimen was passed off the field to pathology. A separate nipple core was taken as a separate specimen. Meticulous hemostasis was achieved using electrocautery and suture ligatures.  At this point, the case was turned over to the plastic surgeon for reconstruction which will be dictated as a separate report.  This procedure was made more technically difficult due to the nipple sparing nature of the mastectomy and previous surgeries with underlying scar tissue which required increased procedural time and service, thus a modifier 22 was used.     Task Performed by RNFA or Surgical Assistant: retraction      SPECIMENS:  1. Right nipple sparing mastectomy- short suture marks superior, long lateral  2. Right nipple core  3. Left nipple sparing mastectomy- short suture marks superior, long lateral  3. Left nipple core      Beth Prajapati DO

## 2025-02-28 NOTE — ANESTHESIA PROCEDURE NOTES
Airway  Date/Time: 2/28/2025 7:59 AM  Urgency: elective    Airway not difficult    Staffing  Performed: CRNA, SRNA and attending   Authorized by: Trey Hooper    Performed by: Trey Hooper  Patient location during procedure: OR    Indications and Patient Condition  Indications for airway management: anesthesia  Spontaneous Ventilation: absent  Sedation level: deep  Preoxygenated: yes  Patient position: sniffing  MILS maintained throughout  Mask difficulty assessment: 2 - vent by mask + OA or adjuvant +/- NMBA  Planned trial extubation    Final Airway Details  Final airway type: endotracheal airway      Successful airway: ETT  Cuffed: yes   Successful intubation technique: video laryngoscopy (Harrison)  Facilitating devices/methods: intubating stylet  Endotracheal tube insertion site: oral  Blade type: Harrison.  Blade size: #3  ETT size (mm): 6.5  Cormack-Lehane Classification: grade I - full view of glottis  Placement verified by: chest auscultation and capnometry   Cuff volume (mL): 6  Measured from: lips  ETT to lips (cm): 20  Number of attempts at approach: 2  Ventilation between attempts: BVM  Number of other approaches attempted: 1    Other Attempts  Unsuccessful attempted airways: endotracheal tube  Unsuccessful attempted endotracheal techniques: video laryngoscopy    Additional Comments  Unable to advance 7.0 ETT, re attempted with 6.0 ETT easy placement

## 2025-02-28 NOTE — ANESTHESIA POSTPROCEDURE EVALUATION
Patient: Sarah Welch    Procedure Summary       Date: 02/28/25 Room / Location: [facility] OR 04 / Virtual [facility] OR    Anesthesia Start: 0743 Anesthesia Stop: 1115    Procedures:       BILATERAL NIPPLE SPARING MASTECTOMY (Bilateral)      RECONSTRUCTION, BREAST, BILATERAL, IMMEDIATE, WITH BREAST IMPLANT INSERTION (Bilateral) Diagnosis:       Lobular carcinoma in situ (LCIS) of right breast      At increased risk of breast cancer      (Lobular carcinoma in situ (LCIS) of right breast [D05.01])      (At increased risk of breast cancer [Z91.89])    Surgeons: Beth Prajapati DO; Rex Jimenez MD Responsible Provider: Cely Shea MD    Anesthesia Type: general ASA Status: 1            Anesthesia Type: general    Vitals Value Taken Time   BP 97/61 02/28/25 1115   Temp 36.2 °C (97.2 °F) 02/28/25 1115   Pulse 69 02/28/25 1117   Resp 20 02/28/25 1117   SpO2 100 % 02/28/25 1117   Vitals shown include unfiled device data.    Anesthesia Post Evaluation    Patient participation: complete - patient participated  Level of consciousness: awake and alert and responsive to verbal stimuli  Pain score: 0  Pain management: adequate  Airway patency: patent  Cardiovascular status: acceptable, hemodynamically stable and stable  Respiratory status: acceptable, nonlabored ventilation, spontaneous ventilation and unassisted  Hydration status: acceptable  Postoperative Nausea and Vomiting: moderate (Required pharmacological escalation. Improved thereafter.)        There were no known notable events for this encounter.

## 2025-02-28 NOTE — CONSULTS
Vancomycin Dosing by Pharmacy- INITIAL    Sarah Welch is a 44 y.o. year old female who Pharmacy has been consulted for vancomycin dosing for other surgical prophylaxis . Based on the patient's indication and renal status this patient will be dosed based on a goal AUC of 400-600.     Renal function is currently stable.    Visit Vitals  /67 (BP Location: Left arm, Patient Position: Lying)   Pulse 78   Temp 36.2 °C (97.2 °F) (Temporal)   Resp 16        Lab Results   Component Value Date    CREATININE 0.90 2025    CREATININE 0.96 2021    CREATININE 1.13 (H) 2019    CREATININE 0.92 2019        Patient weight is as follows:   Vitals:    25 0655   Weight: 57.2 kg (126 lb)       Cultures:  No results found for the encounter in last 14 days.        No intake/output data recorded.  I/O during current shift:  I/O this shift:  In: 1800 [I.V.:1700; IV Piggyback:100]  Out: 210 [Drains:210]    Temp (24hrs), Av.4 °C (97.5 °F), Min:36.2 °C (97.2 °F), Max:36.8 °C (98.2 °F)         Assessment/Plan     Patient will not be given a loading dose.  Will initiate vancomycin maintenance,  750mg mg every 12 hours.    This dosing regimen is predicted by [employer] to result in the following pharmacokinetic parameters:    Loading dose: N/A  Regimen: 750 mg IV every 12 hours.  Start time: 13:40 on 2025  Exposure target: AUC24 (range)400-600 mg/L.hr   ULJ42-49: 381 mg/L.hr  AUC24,ss: 486 mg/L.hr  Probability of AUC24 > 400: 72 %  Ctrough,ss: 15.3 mg/L  Probability of Ctrough,ss > 20: 26 %    Follow-up level will be ordered on  at 0900 unless clinically indicated sooner.  Will continue to monitor renal function daily while on vancomycin and order serum creatinine at least every 48 hours if not already ordered.  Follow for continued vancomycin needs, clinical response, and signs/symptoms of toxicity.       Keri Mendoza, PharmD

## 2025-02-28 NOTE — ANESTHESIA PREPROCEDURE EVALUATION
Patient: Sarah Welch    Procedure Information       Anesthesia Start Date/Time: 02/28/25 0743    Procedures:       BILATERAL NIPPLE SPARING MASTECTOMY (Bilateral)      RECONSTRUCTION, BREAST, BILATERAL, IMMEDIATE, WITH BREAST IMPLANT INSERTION (Bilateral)    Location: [facility] OR 04 / [facility] OR    Surgeons: Beth Prajapati DO; Rex Jimenez MD            Relevant Problems   No relevant active problems       Clinical information reviewed:   Tobacco  Allergies  Meds   Med Hx  Surg Hx  OB Status  Fam Hx  Soc   Hx        NPO Detail:  NPO/Void Status  Carbohydrate Drink Given Prior to Surgery? : N  Date of Last Liquid: 02/28/25  Time of Last Liquid: 0530  Date of Last Solid: 02/27/25  Time of Last Solid: 1800  Last Intake Type: Clear fluids  Time of Last Void: 0630         Physical Exam    Airway  Mallampati: II  TM distance: <3 FB  Neck ROM: full     Cardiovascular   Rhythm: regular  Rate: normal     Dental - normal exam     Pulmonary   Breath sounds clear to auscultation     Abdominal - normal exam             Anesthesia Plan    History of general anesthesia?: yes  History of complications of general anesthesia?: no    ASA 1     general     The patient is not a current smoker.    intravenous induction   Anesthetic plan and risks discussed with patient.    Plan discussed with CRNA.

## 2025-03-01 VITALS
SYSTOLIC BLOOD PRESSURE: 117 MMHG | TEMPERATURE: 98.4 F | OXYGEN SATURATION: 94 % | WEIGHT: 126 LBS | RESPIRATION RATE: 16 BRPM | HEART RATE: 91 BPM | BODY MASS INDEX: 23.79 KG/M2 | DIASTOLIC BLOOD PRESSURE: 66 MMHG | HEIGHT: 61 IN

## 2025-03-01 PROBLEM — Z91.89 AT INCREASED RISK FOR MALIGNANT NEOPLASM OF BREAST: Status: RESOLVED | Noted: 2024-09-25 | Resolved: 2025-03-01

## 2025-03-01 PROBLEM — D05.01 LOBULAR CARCINOMA IN SITU (LCIS) OF RIGHT BREAST: Status: RESOLVED | Noted: 2024-09-25 | Resolved: 2025-03-01

## 2025-03-01 LAB
ANION GAP SERPL CALC-SCNC: 9 MMOL/L (ref 10–20)
BUN SERPL-MCNC: 11 MG/DL (ref 6–23)
CALCIUM SERPL-MCNC: 7.9 MG/DL (ref 8.6–10.3)
CHLORIDE SERPL-SCNC: 106 MMOL/L (ref 98–107)
CO2 SERPL-SCNC: 27 MMOL/L (ref 21–32)
CREAT SERPL-MCNC: 0.86 MG/DL (ref 0.5–1.05)
EGFRCR SERPLBLD CKD-EPI 2021: 85 ML/MIN/1.73M*2
GLUCOSE SERPL-MCNC: 99 MG/DL (ref 74–99)
HOLD SPECIMEN: NORMAL
POTASSIUM SERPL-SCNC: 4.1 MMOL/L (ref 3.5–5.3)
SODIUM SERPL-SCNC: 138 MMOL/L (ref 136–145)
VANCOMYCIN SERPL-MCNC: 13.9 UG/ML (ref 5–20)

## 2025-03-01 PROCEDURE — 7100000011 HC EXTENDED STAY RECOVERY HOURLY - NURSING UNIT

## 2025-03-01 PROCEDURE — 36415 COLL VENOUS BLD VENIPUNCTURE: CPT

## 2025-03-01 PROCEDURE — 80202 ASSAY OF VANCOMYCIN: CPT

## 2025-03-01 PROCEDURE — 96372 THER/PROPH/DIAG INJ SC/IM: CPT | Performed by: PHYSICIAN ASSISTANT

## 2025-03-01 PROCEDURE — 36415 COLL VENOUS BLD VENIPUNCTURE: CPT | Performed by: PHYSICIAN ASSISTANT

## 2025-03-01 PROCEDURE — 2500000004 HC RX 250 GENERAL PHARMACY W/ HCPCS (ALT 636 FOR OP/ED): Performed by: PHYSICIAN ASSISTANT

## 2025-03-01 PROCEDURE — 2500000001 HC RX 250 WO HCPCS SELF ADMINISTERED DRUGS (ALT 637 FOR MEDICARE OP): Performed by: PHYSICIAN ASSISTANT

## 2025-03-01 PROCEDURE — 80048 BASIC METABOLIC PNL TOTAL CA: CPT | Performed by: PHYSICIAN ASSISTANT

## 2025-03-01 PROCEDURE — 2500000004 HC RX 250 GENERAL PHARMACY W/ HCPCS (ALT 636 FOR OP/ED)

## 2025-03-01 RX ORDER — DOCUSATE SODIUM 100 MG/1
100 CAPSULE, LIQUID FILLED ORAL 2 TIMES DAILY
Qty: 10 CAPSULE | Refills: 0 | Status: SHIPPED | OUTPATIENT
Start: 2025-03-01 | End: 2025-03-06

## 2025-03-01 RX ORDER — OXYCODONE HYDROCHLORIDE 5 MG/1
5 TABLET ORAL EVERY 6 HOURS PRN
Qty: 15 TABLET | Refills: 0 | Status: SHIPPED | OUTPATIENT
Start: 2025-03-01 | End: 2025-03-08

## 2025-03-01 RX ADMIN — HEPARIN SODIUM 5000 UNITS: 5000 INJECTION, SOLUTION INTRAVENOUS; SUBCUTANEOUS at 05:34

## 2025-03-01 RX ADMIN — OXYCODONE HYDROCHLORIDE 5 MG: 5 TABLET ORAL at 03:43

## 2025-03-01 RX ADMIN — OXYCODONE HYDROCHLORIDE 5 MG: 5 TABLET ORAL at 07:45

## 2025-03-01 RX ADMIN — VANCOMYCIN HYDROCHLORIDE 750 MG: 750 INJECTION, POWDER, LYOPHILIZED, FOR SOLUTION INTRAVENOUS at 05:34

## 2025-03-01 RX ADMIN — ACETAMINOPHEN 325MG 650 MG: 325 TABLET ORAL at 07:45

## 2025-03-01 ASSESSMENT — COGNITIVE AND FUNCTIONAL STATUS - GENERAL: MOBILITY SCORE: 24

## 2025-03-01 ASSESSMENT — PAIN - FUNCTIONAL ASSESSMENT: PAIN_FUNCTIONAL_ASSESSMENT: 0-10

## 2025-03-01 ASSESSMENT — PAIN DESCRIPTION - LOCATION: LOCATION: BREAST

## 2025-03-01 ASSESSMENT — PAIN DESCRIPTION - ORIENTATION: ORIENTATION: RIGHT;LEFT

## 2025-03-01 ASSESSMENT — PAIN SCALES - GENERAL
PAINLEVEL_OUTOF10: 7
PAINLEVEL_OUTOF10: 6
PAINLEVEL_OUTOF10: 5 - MODERATE PAIN

## 2025-03-01 NOTE — NURSING NOTE
The writer went through discharge instructions with the patient and family. No questions noted after discharge. IV was removed. Meds to bed delivered. Vitals stable. The writer educated on how to care for drains at home. The patient wanted to walk out of the hospital and did not want to go by wheelchair.

## 2025-03-01 NOTE — DISCHARGE SUMMARY
Discharge Diagnosis  Lobular carcinoma in situ, increased risk for breast cancer    Issues Requiring Follow-Up  pathology    Test Results Pending At Discharge  Pending Labs       Order Current Status    Surgical Pathology Exam In process            Hospital Course   Presented on 2/28/2025 for risk reducing bilateral nipple sparing mastectomy with immediate reconstruction.  Admitted to extended recovery for post op monitoring and pain control.  Discharged to home in stable condition on POD #1.    Pertinent Physical Exam At Time of Discharge  Physical Exam  General: alert and oriented x 3.  No acute distress.  Resp: non labored breathing RA  CV: vital signs reviewed  Breasts:   RIGHT: surgically absent with implant reconstruction.  Flaps viable. NAC viable.  Drains serosanguinous.   LEFT: surgically absent with implant reconstruction.  Flaps viable.  NAC viable.  Drains serosanguinous.    Home Medications     Medication List      START taking these medications     cyclobenzaprine 5 mg tablet; Commonly known as: Flexeril; Take 1 tablet   (5 mg) by mouth every 8 hours if needed for muscle spasms.   docusate sodium 100 mg capsule; Commonly known as: Colace; Take 1   capsule (100 mg) by mouth 2 times a day for 10 doses.   ondansetron 4 mg tablet; Commonly known as: Zofran; Take 1 tablet (4 mg)   by mouth every 6 hours if needed for nausea for up to 9 doses.   oxyCODONE 5 mg immediate release tablet; Commonly known as: Roxicodone;   Take 1 tablet (5 mg) by mouth every 6 hours if needed for severe pain (7 -   10) for up to 15 doses.   sulfamethoxazole-trimethoprim 800-160 mg tablet; Commonly known as:   Bactrim DS; Take 1 tablet by mouth 2 times a day for 14 days.     CONTINUE taking these medications     albuterol 90 mcg/actuation inhaler   chlorhexidine 4 % external liquid; Commonly known as: Hibiclens; Apply   topically once daily as needed for wound care.   montelukast 10 mg tablet; Commonly known as: Singulair    Nitro-Bid 2 % ointment; Generic drug: nitroglycerin; Apply to nipples   and breast skin daily after showering. Wash hands after application. Cover   the breast with gauze.       Outpatient Follow-Up  Future Appointments   Date Time Provider Department Center   3/5/2025 10:20 AM Monica Garduno PA-C [id number] Prajapati, DO

## 2025-03-03 ENCOUNTER — PHARMACY VISIT (OUTPATIENT)
Dept: PHARMACY | Facility: CLINIC | Age: 45
End: 2025-03-03
Payer: COMMERCIAL

## 2025-03-05 ENCOUNTER — APPOINTMENT (OUTPATIENT)
Dept: PLASTIC SURGERY | Facility: CLINIC | Age: 45
End: 2025-03-05
Payer: COMMERCIAL

## 2025-03-05 VITALS — BODY MASS INDEX: 23.79 KG/M2 | HEIGHT: 61 IN | WEIGHT: 126 LBS

## 2025-03-05 DIAGNOSIS — Z98.890 S/P BREAST RECONSTRUCTION: Primary | ICD-10-CM

## 2025-03-05 DIAGNOSIS — Z90.13 STATUS POST BILATERAL MASTECTOMY: ICD-10-CM

## 2025-03-05 PROCEDURE — 3008F BODY MASS INDEX DOCD: CPT | Performed by: PHYSICIAN ASSISTANT

## 2025-03-05 PROCEDURE — 99024 POSTOP FOLLOW-UP VISIT: CPT | Performed by: PHYSICIAN ASSISTANT

## 2025-03-05 NOTE — PROGRESS NOTES
Department of Plastic and Reconstructive Surgery            Post Operative Visit    Date: 03/05/25  Date of Surgery: 2/28/2025    Subjective   Sarah Welch is a 44 y.o. female who presents for POV. They are s/p bilateral nipple sparing mastectomy with right SLNB and immediate bilateral direct to implant breast reconstruction with placement of 350cc structured saline implant  on 2/28/2025 with Dr. Prajapati and Dr. Jimenez.       She presents for POV. She is recovering well. She has well controlled post op pain. Drain outputs remain elevated. She has been using nitrobid to the nipples daily.       Objective   Vital Signs: There were no vitals filed for this visit.    Gen: interactive and pleasant  Head: NCAT  Eyes: EOMI, PERRLA  Mouth: MMM  Throat: trachea midline  Cor: RRR  Pulm: nonlabored breathing  Abd: s/nt/nd  Neuro: AAOx3  Ext: extremities perfused    Focused exam of the: breast    Right breast with IMF surgical incision that is clean, dry, intact with dermabond and pernio. There is good perfusion of mastectomy skin flaps and NAC. There is no erythema or concerns for infection. There is no dehiscence. SHAQ drain x2 serosanguineous.    Left breast with IMF surgical incision that is clean, dry, intact with dermabond and pernio. There is good perfusion of mastectomy skin flaps and NAC. There is no erythema or concerns for infection. There is no dehiscence. SHAQ drain x2 serosanguineous.    Assessment/Plan     Sarah Welch is a 44 y.o. female who presents for POV. They are s/p bilateral nipple sparing mastectomy with right SLNB and immediate bilateral direct to implant breast reconstruction with placement of 350cc structured saline implant  on 2/28/2025 with Dr. Prajapati and Dr. Jimenez.       SHAQ drains in place. No erythema or edema surrounding the drain site. There is serous output from the drain. Patient recorded output of the drains showed 2 consecutive days of less than 30cc output at time  of removal. Patient was educated on purpose of surgical drains and informed of risk for seroma post drain removal.  instructed her on possibility of seroma formation, and signs and symptoms of this. We discussed that if she does have a seroma formed, we would send her for interventional radiology percutaneous drain placement.Patient verbalized understanding.     SHAQ drains removed at this visit: 1    She is to continue with drain care for an additional week. Continue with nitrobid daily to the nipples following hot showers. Continue lifting restrictions and activity precautions.          Plan:   She is to continue with drain care for an additional week. Continue with nitrobid daily to the nipples following hot showers. Continue lifting restrictions and activity precautions.     I spent 20 minutes with this patient. Greater than 50% of this time was spent in the counselling and/or coordination of care of this patient.  This note was created using voice recognition software and was not corrected for typographical or grammatical errors.    Signature: Monica Bella PA-C

## 2025-03-10 ENCOUNTER — OFFICE VISIT (OUTPATIENT)
Dept: PLASTIC SURGERY | Facility: CLINIC | Age: 45
End: 2025-03-10
Payer: COMMERCIAL

## 2025-03-10 VITALS — WEIGHT: 126 LBS | BODY MASS INDEX: 23.79 KG/M2 | HEIGHT: 61 IN

## 2025-03-10 DIAGNOSIS — Z98.890 S/P BREAST RECONSTRUCTION: Primary | ICD-10-CM

## 2025-03-10 PROCEDURE — 3008F BODY MASS INDEX DOCD: CPT | Performed by: PHYSICIAN ASSISTANT

## 2025-03-10 PROCEDURE — 99211 OFF/OP EST MAY X REQ PHY/QHP: CPT | Performed by: PHYSICIAN ASSISTANT

## 2025-03-10 NOTE — PROGRESS NOTES
Department of Plastic and Reconstructive Surgery            Post Operative Visit    Date: 03/10/25  Date of Surgery: 2/28/2025    Subjective   Sarah Welch is a 44 y.o. female who presents for POV. They are s/p bilateral nipple sparing mastectomy with right SLNB and immediate bilateral direct to implant breast reconstruction with placement of 350cc structured saline implant  on 2/28/2025 with Dr. Prajapati and Dr. Jimenez.       She presents for drain removal. She is recovering well. Drain output remains elevated.       Objective   Vital Signs: There were no vitals filed for this visit.    Gen: interactive and pleasant  Head: NCAT  Eyes: EOMI, PERRLA  Mouth: MMM  Throat: trachea midline  Cor: RRR  Pulm: nonlabored breathing  Abd: s/nt/nd  Neuro: AAOx3  Ext: extremities perfused    Focused exam of the: breast    Right breast with IMF surgical incision that is clean, dry, intact with dermabond and pernio. There is good perfusion of mastectomy skin flaps and NAC. There is no erythema or concerns for infection. There is no dehiscence. SHAQ drain x2 serosanguineous.    Left breast with IMF surgical incision that is clean, dry, intact with dermabond and pernio. There is good perfusion of mastectomy skin flaps and NAC. There is no erythema or concerns for infection. There is no dehiscence. SHAQ drain x1 serosanguineous.    Assessment/Plan     Sarah Welch is a 44 y.o. female who presents for POV. They are s/p bilateral nipple sparing mastectomy with right SLNB and immediate bilateral direct to implant breast reconstruction with placement of 350cc structured saline implant  on 2/28/2025 with Dr. Prajapati and Dr. Jimenez.       SHAQ drains in place. No erythema or edema surrounding the drain site. There is serous output from the drain. Patient recorded output of the drains showed 2 consecutive days of less than 30cc output at time of removal. Patient was educated on purpose of surgical drains and informed  of risk for seroma post drain removal.  instructed her on possibility of seroma formation, and signs and symptoms of this. We discussed that if she does have a seroma formed, we would send her for interventional radiology percutaneous drain placement.Patient verbalized understanding.     SHAQ drains removed at this visit: 1 left drain    She is to continue with drain care for an additional week. She may discontinue nitrobid.  Continue lifting restrictions and activity precautions.          Plan:   She is to continue with drain care for an additional week. Discontinue nitrobid.  Continue lifting restrictions and activity precautions.     I spent 20 minutes with this patient. Greater than 50% of this time was spent in the counselling and/or coordination of care of this patient.  This note was created using voice recognition software and was not corrected for typographical or grammatical errors.    Signature: Monica Bella PA-C

## 2025-03-12 LAB
LAB AP ASR DISCLAIMER: NORMAL
LABORATORY COMMENT REPORT: NORMAL
PATH REPORT.COMMENTS IMP SPEC: NORMAL
PATH REPORT.FINAL DX SPEC: NORMAL
PATH REPORT.GROSS SPEC: NORMAL
PATH REPORT.RELEVANT HX SPEC: NORMAL
PATH REPORT.TOTAL CANCER: NORMAL

## 2025-03-17 ENCOUNTER — APPOINTMENT (OUTPATIENT)
Dept: PLASTIC SURGERY | Facility: CLINIC | Age: 45
End: 2025-03-17
Payer: COMMERCIAL

## 2025-03-17 VITALS — HEART RATE: 63 BPM | SYSTOLIC BLOOD PRESSURE: 113 MMHG | DIASTOLIC BLOOD PRESSURE: 76 MMHG

## 2025-03-17 DIAGNOSIS — Z98.890 S/P BREAST RECONSTRUCTION: Primary | ICD-10-CM

## 2025-03-17 PROCEDURE — 99024 POSTOP FOLLOW-UP VISIT: CPT | Performed by: PHYSICIAN ASSISTANT

## 2025-03-17 ASSESSMENT — PAIN SCALES - GENERAL: PAINLEVEL_OUTOF10: 2

## 2025-03-17 NOTE — PROGRESS NOTES
Department of Plastic and Reconstructive Surgery            Post Operative Visit    Date: 03/17/25  Date of Surgery: 2/28/2025    Subjective   Sarah Welch is a 44 y.o. female who presents for POV. They are s/p bilateral nipple sparing mastectomy with right SLNB and immediate bilateral direct to implant breast reconstruction with placement of 350cc structured saline implant  on 2/28/2025 with Dr. Prajapati and Dr. Jimenez.       She presents for drain removal. She is recovering well. Drain output has been 30cc or less for 2 consecutive days    Objective   Vital Signs:   Vitals:    03/17/25 0938   BP: 113/76   Pulse: 63       Gen: interactive and pleasant  Head: NCAT  Eyes: EOMI, PERRLA  Mouth: MMM  Throat: trachea midline  Cor: RRR  Pulm: nonlabored breathing  Abd: s/nt/nd  Neuro: AAOx3  Ext: extremities perfused    Focused exam of the: breast    Right breast with IMF surgical incision that is well healed.  There is good perfusion of mastectomy skin flaps and NAC. There is no erythema or concerns for infection. There is no dehiscence. SHAQ drain x1 serosanguineous.    Left breast with IMF surgical incision that is well healed.  There is good perfusion of mastectomy skin flaps and NAC. There is no erythema or concerns for infection. There is no dehiscence. SHAQ drain x1 serosanguineous.    Assessment/Plan     Sarah Welch is a 44 y.o. female who presents for POV. They are s/p bilateral nipple sparing mastectomy with right SLNB and immediate bilateral direct to implant breast reconstruction with placement of 350cc structured saline implant  on 2/28/2025 with Dr. Prajapati and Dr. Jimenez.       SHAQ drains in place. No erythema or edema surrounding the drain site. There is serous output from the drain. Patient recorded output of the drains showed 2 consecutive days of less than 30cc output at time of removal. Patient was educated on purpose of surgical drains and informed of risk for seroma post drain  removal.  instructed her on possibility of seroma formation, and signs and symptoms of this. We discussed that if she does have a seroma formed, we would send her for interventional radiology percutaneous drain placement.Patient verbalized understanding.     SHAQ drains removed at this visit: 2    Plan:   Continue lifting restrictions and activity precautions to post op week 4.   Follow up in 1-2 weeks for 1 month POV   2 drains removed today    I spent 20 minutes with this patient. Greater than 50% of this time was spent in the counselling and/or coordination of care of this patient.  This note was created using voice recognition software and was not corrected for typographical or grammatical errors.    Signature: Monica Bella PA-C

## 2025-03-25 ENCOUNTER — OFFICE VISIT (OUTPATIENT)
Dept: SURGICAL ONCOLOGY | Facility: CLINIC | Age: 45
End: 2025-03-25
Payer: COMMERCIAL

## 2025-03-25 DIAGNOSIS — Z91.89 AT HIGH RISK FOR BREAST CANCER: Primary | ICD-10-CM

## 2025-03-25 PROCEDURE — 99211 OFF/OP EST MAY X REQ PHY/QHP: CPT | Performed by: SURGERY

## 2025-03-25 NOTE — PROGRESS NOTES
BREAST SURGERY POST OPERATIVE VISIT    Assessment/Plan     High risk for breast cancer with lifetime risk 80%   -s/p risk reducing mastectomies with benign pathology    We reviewed the pathology results from surgery and the patient was provided with a copy of the pathology report.    Benign pathology on excision.  Her surgical treatment is complete.    I will follow up at the time of next mammogram for imaging and clinical exam or sooner for any breast concerns.    Ozzie Welch is a 44 y.o. female here for post op visit s/p bilateral nipple sparing mastectomy.    Date of surgery: 2/28/2025  Pathology     FINAL DIAGNOSIS   A. Right nipple sparing, prophylactic mastectomy:   -- Lobular carcinoma in situ, classic type, involving a complex sclerosing lesion.   -- Usual ductal hyperplasia.   -- Apocrine metaplasia and cysts.   -- Intraductal papilloma.   -- Columnar cell change.  -- Pseudoangiomatous stromal hyperplasia.   -- Changes consistent with site of prior biopsy.      B. Left nipple sparing, prophylactic mastectomy:   -- Usual ductal hyperplasia.   -- Apocrine metaplasia and cysts.   -- Intraductal papilloma.   -- Columnar cell change.  -- Pseudoangiomatous stromal hyperplasia.      C. Left nipple core biopsy:   -- Breast tissue, negative for carcinoma.      D. Right nipple core biopsy:   -- Breast tissue, negative for carcinoma.       Objective   Physical Exam  General: Alert and oriented x 3.  Mood and affect are appropriate.  HEENT: EOMI, PERRLA.   Neck: supple, no masses, no cervical adenopathy.  Cardiovascular: no lower extremity edema.  Pulmonary: breathing non labored on room air.  GI: Abdomen soft, no masses. No hepatomegaly or splenomegaly.  Lymph nodes: No supraclavicular or axillary adenopathy bilaterally.  Musculoskeletal: Full range of motion in the upper extremities bilaterally.  Neuro: denies dizziness, tremors    Breasts: The breasts were examined in both the seated and supine  positions.    RIGHT: surgically absent with implant based reconstruction. The nipple is everted without nipple discharge.  There are no skin changes, skin thickening, or dimpling. There are no masses palpated in the RIGHT breast. Healing IMF incision.  LEFT: surgically absent with implant based reconstruction. The nipple is everted without nipple discharge.  There are no skin changes, skin thickening, or dimpling. There are no masses palpated in the LEFT breast. Healing IMF incision.    Radiology review: All images and reports were personally reviewed.         Beth Prajapati, DO

## 2025-04-07 ENCOUNTER — APPOINTMENT (OUTPATIENT)
Facility: CLINIC | Age: 45
End: 2025-04-07
Payer: COMMERCIAL

## 2025-04-16 ENCOUNTER — APPOINTMENT (OUTPATIENT)
Facility: CLINIC | Age: 45
End: 2025-04-16
Payer: COMMERCIAL

## 2025-04-17 ENCOUNTER — APPOINTMENT (OUTPATIENT)
Facility: CLINIC | Age: 45
End: 2025-04-17
Payer: COMMERCIAL

## 2025-04-17 VITALS — BODY MASS INDEX: 23.79 KG/M2 | HEIGHT: 61 IN | WEIGHT: 126 LBS

## 2025-04-17 DIAGNOSIS — Z98.890 S/P BREAST RECONSTRUCTION: Primary | ICD-10-CM

## 2025-04-17 PROCEDURE — 99024 POSTOP FOLLOW-UP VISIT: CPT | Performed by: PHYSICIAN ASSISTANT

## 2025-04-17 PROCEDURE — 3008F BODY MASS INDEX DOCD: CPT | Performed by: PHYSICIAN ASSISTANT

## 2025-04-17 NOTE — PROGRESS NOTES
Department of Plastic and Reconstructive Surgery            Post Operative Visit    Date: 04/17/25  Date of Surgery: 2/28/2025    Subjective   Sarah Welch is a 44 y.o. female who presents for POV. They are s/p bilateral nipple sparing mastectomy with right SLNB and immediate bilateral direct to implant breast reconstruction with placement of 350cc structured saline implant  on 2/28/2025 with Dr. Prajapati and Dr. Jimenez.       She presents for drain removal. She is recovering well. Drain output has been 30cc or less for 2 consecutive days    Objective   Vital Signs:   There were no vitals filed for this visit.      Gen: interactive and pleasant  Head: NCAT  Eyes: EOMI, PERRLA  Mouth: MMM  Throat: trachea midline  Cor: RRR  Pulm: nonlabored breathing  Abd: s/nt/nd  Neuro: AAOx3  Ext: extremities perfused    Focused exam of the: breast    Right breast with IMF surgical incision that is well healed.  There is good perfusion of mastectomy skin flaps and NAC. There is no erythema or concerns for infection. There is no dehiscence. SHAQ drain x1 serosanguineous.    Left breast with IMF surgical incision that is well healed.  There is good perfusion of mastectomy skin flaps and NAC. There is no erythema or concerns for infection. There is no dehiscence. SHAQ drain x1 serosanguineous.    Assessment/Plan     Sarah Welch is a 44 y.o. female who presents for POV. They are s/p bilateral nipple sparing mastectomy with right SLNB and immediate bilateral direct to implant breast reconstruction with placement of 350cc structured saline implant  on 2/28/2025 with Dr. Prajapati and Dr. Jimenez.       She presents for 1 month pOV. She has recovered well and is mostly back to normal activity. She may use silicone scar gel or tape. She endorses she has a basal cell on her chest that she has to have removed. We will see her back at 1 month pre-op visit.     Plan:   Follow up at pre op visit    I spent 20 minutes with  this patient. Greater than 50% of this time was spent in the counselling and/or coordination of care of this patient.  This note was created using voice recognition software and was not corrected for typographical or grammatical errors.    Signature: Monica Bella PA-C

## 2025-04-28 ENCOUNTER — PREP FOR PROCEDURE (OUTPATIENT)
Dept: OCCUPATIONAL MEDICINE | Facility: HOSPITAL | Age: 45
End: 2025-04-28
Payer: COMMERCIAL

## 2025-04-28 DIAGNOSIS — Z98.890 S/P BREAST RECONSTRUCTION, BILATERAL: Primary | ICD-10-CM

## 2025-12-01 ENCOUNTER — APPOINTMENT (OUTPATIENT)
Facility: CLINIC | Age: 45
End: 2025-12-01
Payer: COMMERCIAL

## (undated) DEVICE — ADHESIVE, SKIN, MASTISOL, 2/3 CC VIAL

## (undated) DEVICE — Device

## (undated) DEVICE — SOLUTION, IRRIGATION, SODIUM CHLORIDE 0.9%, 1000 ML, POUR BOTTLE

## (undated) DEVICE — COVER, MAYO STAND, W/PAD, 23 IN, DISPOSABLE, PLASTIC, LF, STERILE

## (undated) DEVICE — EVACUATOR, WOUND, SUCTION, CLOSED, JACKSON-PRATT, 100 CC, SILICONE

## (undated) DEVICE — CONTAINER, SPECIMEN, 4 OZ, OR PEEL PACK, STERILE

## (undated) DEVICE — SUTURE, VICRYL, 2-0, 27 IN, SH, UNDYED

## (undated) DEVICE — SOLUTION, INJECTION, USP, SODIUM CHLORIDE 0.9%, .9, NACL, 1000 ML, BAG

## (undated) DEVICE — SUTURE, ETHILON, 3-0, 18 IN, PS2, BLACK

## (undated) DEVICE — APPLICATOR, CHLORAPREP, W/ORANGE TINT, 26ML

## (undated) DEVICE — GLOVE, SURGICAL, PROTEXIS PI MICRO, 8.0, PF, LF

## (undated) DEVICE — SYRINGE, 60 CC, LUER LOCK, MONOJECT, W/O CAP, LF

## (undated) DEVICE — SUPPORT, MAMMARY, THERAPEUTIC, SURGI-BRA, LARGE, LF

## (undated) DEVICE — CLOSURE SYSTEM, DERMABOND, PRINEO, 22CM, STERILE

## (undated) DEVICE — SLEEVE, SUCTION, E-SEP, 125MM

## (undated) DEVICE — MARKER, SKIN, DUAL TIP, W/RULER AND LABEL

## (undated) DEVICE — SUTURE, PROLENE, 0, 30 IN, SH

## (undated) DEVICE — PHOTONGUIDE, WIDE/FLAT LIGHT CARRIER

## (undated) DEVICE — SUTURE, VICRYL, 3-0, 27 IN, SH

## (undated) DEVICE — CONTAINER STERILE SPECIMEN 90ML, STERILE

## (undated) DEVICE — SUTURE, PDS II, 0, 27 IN, CT1, VIOLET

## (undated) DEVICE — SUTURE, PLAIN, 5-0, 18 IN, P3, YELLOW

## (undated) DEVICE — GLOVE, SURGICAL, PROTEXIS PI MICRO, 7.5, PF, LF

## (undated) DEVICE — CAUTERY, PENCIL, PUSH BUTTON, SMOKE EVAC, 70MM

## (undated) DEVICE — NEEDLE, SPINAL, 22 G X 3.5 IN, BLACK HUB

## (undated) DEVICE — DRESSING, TRANSPARENT, TEGADERM, 4 X 4-3/4 IN

## (undated) DEVICE — SUTURE, STRATAFIX, 3-0, SPIRAL MONOCRYL PLUS, PS, 70CM, UNDYED

## (undated) DEVICE — BANDAGE, GAUZE, 6 PLY, KERLIX, 4.5 IN X 4.1 YD, AMD, STERILE

## (undated) DEVICE — SOLUTION, IRRIGATION, STERILE WATER, 1000 ML, POUR BOTTLE

## (undated) DEVICE — TOWEL PACK, STERILE, 4/PACK, BLUE

## (undated) DEVICE — TIP,  ELECTRODE COATED INSULATED, EXTENDED, LF

## (undated) DEVICE — ELECTRODE, ELECTROSURGICAL, BLADE EXT 4 INCH, INSULATED

## (undated) DEVICE — SUTURE, PDS II, 2-0, 27 IN, SH, VIOLET

## (undated) DEVICE — DRAIN, JP CHANNEL, 15 FR, RND, W/TROCAR

## (undated) DEVICE — SUTURE, MONOCRYL, 4-0, 27 IN, PS-2, UNDYED

## (undated) DEVICE — GLOVE, SURGICAL, PROTEXIS PI , 5.5, PF, LF

## (undated) DEVICE — WOUND SYSTEM, DEBRIDEMENT & CLEANING, O.R DUOPAK